# Patient Record
Sex: FEMALE | Race: WHITE | NOT HISPANIC OR LATINO
[De-identification: names, ages, dates, MRNs, and addresses within clinical notes are randomized per-mention and may not be internally consistent; named-entity substitution may affect disease eponyms.]

---

## 2017-01-05 ENCOUNTER — APPOINTMENT (OUTPATIENT)
Dept: PULMONOLOGY | Facility: CLINIC | Age: 67
End: 2017-01-05

## 2017-02-15 ENCOUNTER — APPOINTMENT (OUTPATIENT)
Dept: INTERNAL MEDICINE | Facility: CLINIC | Age: 67
End: 2017-02-15

## 2017-02-15 VITALS
HEART RATE: 99 BPM | BODY MASS INDEX: 31.8 KG/M2 | SYSTOLIC BLOOD PRESSURE: 132 MMHG | HEIGHT: 60 IN | WEIGHT: 162 LBS | DIASTOLIC BLOOD PRESSURE: 70 MMHG | OXYGEN SATURATION: 98 %

## 2017-02-17 LAB
ALBUMIN SERPL ELPH-MCNC: 3.9 G/DL
ALP BLD-CCNC: 101 U/L
ALT SERPL-CCNC: 21 U/L
ANION GAP SERPL CALC-SCNC: 17 MMOL/L
APPEARANCE: CLEAR
AST SERPL-CCNC: 19 U/L
BACTERIA: ABNORMAL
BASOPHILS # BLD AUTO: 0.03 K/UL
BASOPHILS NFR BLD AUTO: 0.5 %
BILIRUB SERPL-MCNC: 0.4 MG/DL
BILIRUBIN URINE: NEGATIVE
BLOOD URINE: NEGATIVE
BUN SERPL-MCNC: 16 MG/DL
CALCIUM OXALATE CRYSTALS: ABNORMAL
CALCIUM SERPL-MCNC: 9.7 MG/DL
CHLORIDE SERPL-SCNC: 103 MMOL/L
CHOLEST SERPL-MCNC: 172 MG/DL
CHOLEST/HDLC SERPL: 4 RATIO
CO2 SERPL-SCNC: 23 MMOL/L
COLOR: YELLOW
CREAT SERPL-MCNC: 0.85 MG/DL
EOSINOPHIL # BLD AUTO: 0.1 K/UL
EOSINOPHIL NFR BLD AUTO: 1.6 %
GLUCOSE QUALITATIVE U: NORMAL MG/DL
GLUCOSE SERPL-MCNC: 79 MG/DL
HBA1C MFR BLD HPLC: 6.2 %
HCT VFR BLD CALC: 43.6 %
HDLC SERPL-MCNC: 43 MG/DL
HGB BLD-MCNC: 13.8 G/DL
HYALINE CASTS: 0 /LPF
IMM GRANULOCYTES NFR BLD AUTO: 0.3 %
KETONES URINE: NEGATIVE
LDLC SERPL CALC-MCNC: 86 MG/DL
LEUKOCYTE ESTERASE URINE: NEGATIVE
LYMPHOCYTES # BLD AUTO: 1.77 K/UL
LYMPHOCYTES NFR BLD AUTO: 27.8 %
MAN DIFF?: NORMAL
MCHC RBC-ENTMCNC: 30.4 PG
MCHC RBC-ENTMCNC: 31.7 GM/DL
MCV RBC AUTO: 96 FL
MICROSCOPIC-UA: NORMAL
MONOCYTES # BLD AUTO: 0.42 K/UL
MONOCYTES NFR BLD AUTO: 6.6 %
NEUTROPHILS # BLD AUTO: 4.02 K/UL
NEUTROPHILS NFR BLD AUTO: 63.2 %
NITRITE URINE: NEGATIVE
PH URINE: 5.5
PLATELET # BLD AUTO: 233 K/UL
POTASSIUM SERPL-SCNC: 4.6 MMOL/L
PROT SERPL-MCNC: 7.1 G/DL
PROTEIN URINE: NEGATIVE MG/DL
RBC # BLD: 4.54 M/UL
RBC # FLD: 13.1 %
RED BLOOD CELLS URINE: 2 /HPF
SODIUM SERPL-SCNC: 143 MMOL/L
SPECIFIC GRAVITY URINE: 1.02
SQUAMOUS EPITHELIAL CELLS: 7 /HPF
TRIGL SERPL-MCNC: 214 MG/DL
URINE COMMENTS: NORMAL
UROBILINOGEN URINE: NORMAL MG/DL
WBC # FLD AUTO: 6.36 K/UL
WHITE BLOOD CELLS URINE: 2 /HPF

## 2017-03-23 ENCOUNTER — APPOINTMENT (OUTPATIENT)
Dept: ORTHOPEDIC SURGERY | Facility: CLINIC | Age: 67
End: 2017-03-23

## 2017-03-23 VITALS
BODY MASS INDEX: 31.02 KG/M2 | HEART RATE: 106 BPM | DIASTOLIC BLOOD PRESSURE: 75 MMHG | HEIGHT: 60 IN | SYSTOLIC BLOOD PRESSURE: 124 MMHG | WEIGHT: 158 LBS

## 2017-04-06 ENCOUNTER — APPOINTMENT (OUTPATIENT)
Dept: ORTHOPEDIC SURGERY | Facility: CLINIC | Age: 67
End: 2017-04-06

## 2017-04-13 ENCOUNTER — APPOINTMENT (OUTPATIENT)
Dept: ORTHOPEDIC SURGERY | Facility: CLINIC | Age: 67
End: 2017-04-13

## 2017-04-20 ENCOUNTER — APPOINTMENT (OUTPATIENT)
Dept: ORTHOPEDIC SURGERY | Facility: CLINIC | Age: 67
End: 2017-04-20

## 2017-05-31 ENCOUNTER — APPOINTMENT (OUTPATIENT)
Dept: VASCULAR SURGERY | Facility: CLINIC | Age: 67
End: 2017-05-31

## 2017-05-31 VITALS — SYSTOLIC BLOOD PRESSURE: 120 MMHG | OXYGEN SATURATION: 97 % | HEART RATE: 75 BPM | DIASTOLIC BLOOD PRESSURE: 75 MMHG

## 2017-06-01 ENCOUNTER — APPOINTMENT (OUTPATIENT)
Dept: ORTHOPEDIC SURGERY | Facility: CLINIC | Age: 67
End: 2017-06-01

## 2017-06-01 VITALS
WEIGHT: 158 LBS | HEART RATE: 96 BPM | DIASTOLIC BLOOD PRESSURE: 68 MMHG | BODY MASS INDEX: 31.02 KG/M2 | HEIGHT: 60 IN | SYSTOLIC BLOOD PRESSURE: 112 MMHG

## 2017-06-06 ENCOUNTER — FORM ENCOUNTER (OUTPATIENT)
Age: 67
End: 2017-06-06

## 2017-06-07 ENCOUNTER — OUTPATIENT (OUTPATIENT)
Dept: OUTPATIENT SERVICES | Facility: HOSPITAL | Age: 67
LOS: 1 days | End: 2017-06-07
Payer: MEDICARE

## 2017-06-07 PROCEDURE — 75635 CT ANGIO ABDOMINAL ARTERIES: CPT | Mod: 26

## 2017-06-07 PROCEDURE — 75635 CT ANGIO ABDOMINAL ARTERIES: CPT

## 2017-06-21 ENCOUNTER — APPOINTMENT (OUTPATIENT)
Dept: VASCULAR SURGERY | Facility: CLINIC | Age: 67
End: 2017-06-21

## 2017-06-27 ENCOUNTER — APPOINTMENT (OUTPATIENT)
Dept: INTERNAL MEDICINE | Facility: CLINIC | Age: 67
End: 2017-06-27

## 2017-06-27 VITALS
DIASTOLIC BLOOD PRESSURE: 74 MMHG | TEMPERATURE: 97.7 F | SYSTOLIC BLOOD PRESSURE: 132 MMHG | HEIGHT: 60 IN | OXYGEN SATURATION: 98 % | BODY MASS INDEX: 32.39 KG/M2 | HEART RATE: 90 BPM | WEIGHT: 165 LBS

## 2017-10-04 ENCOUNTER — APPOINTMENT (OUTPATIENT)
Dept: VASCULAR SURGERY | Facility: CLINIC | Age: 67
End: 2017-10-04
Payer: MEDICARE

## 2017-10-04 VITALS
HEART RATE: 84 BPM | SYSTOLIC BLOOD PRESSURE: 124 MMHG | DIASTOLIC BLOOD PRESSURE: 82 MMHG | OXYGEN SATURATION: 98 % | HEIGHT: 60 IN | WEIGHT: 165 LBS | BODY MASS INDEX: 32.39 KG/M2

## 2017-10-04 PROCEDURE — 99213 OFFICE O/P EST LOW 20 MIN: CPT

## 2017-12-13 ENCOUNTER — RX RENEWAL (OUTPATIENT)
Age: 67
End: 2017-12-13

## 2017-12-14 ENCOUNTER — APPOINTMENT (OUTPATIENT)
Dept: ORTHOPEDIC SURGERY | Facility: CLINIC | Age: 67
End: 2017-12-14
Payer: MEDICARE

## 2017-12-14 VITALS
SYSTOLIC BLOOD PRESSURE: 132 MMHG | HEART RATE: 93 BPM | WEIGHT: 177 LBS | HEIGHT: 60 IN | DIASTOLIC BLOOD PRESSURE: 75 MMHG | BODY MASS INDEX: 34.75 KG/M2

## 2017-12-14 PROCEDURE — 99213 OFFICE O/P EST LOW 20 MIN: CPT

## 2017-12-18 ENCOUNTER — MESSAGE (OUTPATIENT)
Age: 67
End: 2017-12-18

## 2017-12-19 ENCOUNTER — APPOINTMENT (OUTPATIENT)
Dept: ORTHOPEDIC SURGERY | Facility: CLINIC | Age: 67
End: 2017-12-19
Payer: MEDICARE

## 2017-12-19 PROCEDURE — 20610 DRAIN/INJ JOINT/BURSA W/O US: CPT | Mod: 50

## 2017-12-26 ENCOUNTER — APPOINTMENT (OUTPATIENT)
Dept: ORTHOPEDIC SURGERY | Facility: CLINIC | Age: 67
End: 2017-12-26
Payer: MEDICARE

## 2017-12-26 PROCEDURE — 20610 DRAIN/INJ JOINT/BURSA W/O US: CPT | Mod: 50

## 2018-01-02 ENCOUNTER — APPOINTMENT (OUTPATIENT)
Dept: ORTHOPEDIC SURGERY | Facility: CLINIC | Age: 68
End: 2018-01-02
Payer: MEDICARE

## 2018-01-02 VITALS — WEIGHT: 177 LBS | HEIGHT: 60 IN | BODY MASS INDEX: 34.75 KG/M2

## 2018-01-02 PROCEDURE — 20610 DRAIN/INJ JOINT/BURSA W/O US: CPT | Mod: 50

## 2018-01-08 ENCOUNTER — RX RENEWAL (OUTPATIENT)
Age: 68
End: 2018-01-08

## 2018-01-22 ENCOUNTER — APPOINTMENT (OUTPATIENT)
Dept: CT IMAGING | Facility: HOSPITAL | Age: 68
End: 2018-01-22

## 2018-01-22 ENCOUNTER — OUTPATIENT (OUTPATIENT)
Dept: OUTPATIENT SERVICES | Facility: HOSPITAL | Age: 68
LOS: 1 days | End: 2018-01-22
Payer: MEDICARE

## 2018-01-22 PROCEDURE — 71250 CT THORAX DX C-: CPT | Mod: 26

## 2018-01-22 PROCEDURE — 71250 CT THORAX DX C-: CPT

## 2018-02-15 ENCOUNTER — APPOINTMENT (OUTPATIENT)
Dept: INTERNAL MEDICINE | Facility: CLINIC | Age: 68
End: 2018-02-15
Payer: MEDICARE

## 2018-02-15 VITALS
WEIGHT: 168 LBS | DIASTOLIC BLOOD PRESSURE: 74 MMHG | HEART RATE: 90 BPM | BODY MASS INDEX: 32.81 KG/M2 | SYSTOLIC BLOOD PRESSURE: 124 MMHG | OXYGEN SATURATION: 98 %

## 2018-02-15 PROCEDURE — 36415 COLL VENOUS BLD VENIPUNCTURE: CPT

## 2018-02-15 PROCEDURE — 99213 OFFICE O/P EST LOW 20 MIN: CPT | Mod: 25

## 2018-02-19 LAB
25(OH)D3 SERPL-MCNC: 32.9 NG/ML
ALBUMIN SERPL ELPH-MCNC: 4.3 G/DL
ALP BLD-CCNC: 88 U/L
ALT SERPL-CCNC: 22 U/L
ANION GAP SERPL CALC-SCNC: 14 MMOL/L
APPEARANCE: CLEAR
AST SERPL-CCNC: 25 U/L
BACTERIA: NEGATIVE
BASOPHILS # BLD AUTO: 0.01 K/UL
BASOPHILS NFR BLD AUTO: 0.2 %
BILIRUB SERPL-MCNC: 0.4 MG/DL
BILIRUBIN URINE: NEGATIVE
BLOOD URINE: NEGATIVE
BUN SERPL-MCNC: 15 MG/DL
CALCIUM SERPL-MCNC: 9.8 MG/DL
CHLORIDE SERPL-SCNC: 103 MMOL/L
CHOLEST SERPL-MCNC: 169 MG/DL
CHOLEST/HDLC SERPL: 3.3 RATIO
CO2 SERPL-SCNC: 27 MMOL/L
COLOR: YELLOW
CREAT SERPL-MCNC: 0.92 MG/DL
EOSINOPHIL # BLD AUTO: 0.19 K/UL
EOSINOPHIL NFR BLD AUTO: 3.2 %
GLUCOSE QUALITATIVE U: NEGATIVE MG/DL
GLUCOSE SERPL-MCNC: 88 MG/DL
HBA1C MFR BLD HPLC: 6 %
HCT VFR BLD CALC: 43.8 %
HDLC SERPL-MCNC: 51 MG/DL
HGB BLD-MCNC: 13.4 G/DL
HYALINE CASTS: 0 /LPF
IMM GRANULOCYTES NFR BLD AUTO: 0 %
KETONES URINE: NEGATIVE
LDLC SERPL CALC-MCNC: 93 MG/DL
LEUKOCYTE ESTERASE URINE: NEGATIVE
LYMPHOCYTES # BLD AUTO: 1.57 K/UL
LYMPHOCYTES NFR BLD AUTO: 26.1 %
MAN DIFF?: NORMAL
MCHC RBC-ENTMCNC: 30.3 PG
MCHC RBC-ENTMCNC: 30.6 GM/DL
MCV RBC AUTO: 99.1 FL
MICROSCOPIC-UA: NORMAL
MONOCYTES # BLD AUTO: 0.31 K/UL
MONOCYTES NFR BLD AUTO: 5.2 %
NEUTROPHILS # BLD AUTO: 3.93 K/UL
NEUTROPHILS NFR BLD AUTO: 65.3 %
NITRITE URINE: NEGATIVE
PH URINE: 5
PLATELET # BLD AUTO: 208 K/UL
POTASSIUM SERPL-SCNC: 4.6 MMOL/L
PROT SERPL-MCNC: 7.1 G/DL
PROTEIN URINE: NEGATIVE MG/DL
RBC # BLD: 4.42 M/UL
RBC # FLD: 13.2 %
RED BLOOD CELLS URINE: 1 /HPF
SODIUM SERPL-SCNC: 144 MMOL/L
SPECIFIC GRAVITY URINE: 1.01
SQUAMOUS EPITHELIAL CELLS: 0 /HPF
TRIGL SERPL-MCNC: 123 MG/DL
UROBILINOGEN URINE: NEGATIVE MG/DL
WBC # FLD AUTO: 6.01 K/UL
WHITE BLOOD CELLS URINE: 0 /HPF

## 2018-03-13 ENCOUNTER — APPOINTMENT (OUTPATIENT)
Dept: INTERNAL MEDICINE | Facility: CLINIC | Age: 68
End: 2018-03-13
Payer: MEDICARE

## 2018-03-13 VITALS — DIASTOLIC BLOOD PRESSURE: 74 MMHG | OXYGEN SATURATION: 98 % | SYSTOLIC BLOOD PRESSURE: 132 MMHG | HEART RATE: 78 BPM

## 2018-03-13 PROCEDURE — 99213 OFFICE O/P EST LOW 20 MIN: CPT

## 2018-03-14 ENCOUNTER — RX RENEWAL (OUTPATIENT)
Age: 68
End: 2018-03-14

## 2018-09-07 ENCOUNTER — APPOINTMENT (OUTPATIENT)
Dept: INTERNAL MEDICINE | Facility: CLINIC | Age: 68
End: 2018-09-07
Payer: MEDICARE

## 2018-09-07 VITALS
DIASTOLIC BLOOD PRESSURE: 68 MMHG | BODY MASS INDEX: 33.2 KG/M2 | WEIGHT: 170 LBS | OXYGEN SATURATION: 98 % | HEART RATE: 84 BPM | SYSTOLIC BLOOD PRESSURE: 124 MMHG

## 2018-09-07 DIAGNOSIS — R10.32 LEFT LOWER QUADRANT PAIN: ICD-10-CM

## 2018-09-07 PROCEDURE — 99213 OFFICE O/P EST LOW 20 MIN: CPT

## 2018-09-07 NOTE — ASSESSMENT
[FreeTextEntry1] : Patient with elevated BP; Will split current pill; Once in morning and once in evening. Also to obtain sonogram of the left groin. Finally restsrt H2 blocker for the cough

## 2018-09-07 NOTE — HISTORY OF PRESENT ILLNESS
[FreeTextEntry1] : BP up at pharmacy; Today okay' [de-identified] : As above; had a cough for a long time; There was a question of reflux and H2 blocker given with no relief; Was taking omeprazole fo the cough with no relief;

## 2018-09-07 NOTE — PHYSICAL EXAM
[No Acute Distress] : no acute distress [Well Nourished] : well nourished [Well Developed] : well developed [Normal Sclera/Conjunctiva] : normal sclera/conjunctiva [PERRL] : pupils equal round and reactive to light [EOMI] : extraocular movements intact [Normal Outer Ear/Nose] : the outer ears and nose were normal in appearance [Normal Oropharynx] : the oropharynx was normal [No JVD] : no jugular venous distention [Supple] : supple [No Respiratory Distress] : no respiratory distress  [Clear to Auscultation] : lungs were clear to auscultation bilaterally [No Accessory Muscle Use] : no accessory muscle use [Normal Rate] : normal rate  [Regular Rhythm] : with a regular rhythm [Normal S1, S2] : normal S1 and S2 [Soft] : abdomen soft [No HSM] : no HSM [FreeTextEntry1] : Left groin pain [de-identified] : left groin pain

## 2018-09-25 ENCOUNTER — APPOINTMENT (OUTPATIENT)
Dept: ORTHOPEDIC SURGERY | Facility: CLINIC | Age: 68
End: 2018-09-25
Payer: MEDICARE

## 2018-09-25 VITALS
SYSTOLIC BLOOD PRESSURE: 168 MMHG | HEART RATE: 108 BPM | HEIGHT: 60 IN | BODY MASS INDEX: 33.38 KG/M2 | WEIGHT: 170 LBS | DIASTOLIC BLOOD PRESSURE: 74 MMHG

## 2018-09-25 PROCEDURE — 73562 X-RAY EXAM OF KNEE 3: CPT | Mod: 50

## 2018-09-25 PROCEDURE — 99213 OFFICE O/P EST LOW 20 MIN: CPT

## 2018-09-27 ENCOUNTER — APPOINTMENT (OUTPATIENT)
Dept: ORTHOPEDIC SURGERY | Facility: CLINIC | Age: 68
End: 2018-09-27
Payer: MEDICARE

## 2018-09-27 ENCOUNTER — RX RENEWAL (OUTPATIENT)
Age: 68
End: 2018-09-27

## 2018-09-27 PROCEDURE — 20610 DRAIN/INJ JOINT/BURSA W/O US: CPT | Mod: 50

## 2018-10-01 ENCOUNTER — RX RENEWAL (OUTPATIENT)
Age: 68
End: 2018-10-01

## 2018-10-04 ENCOUNTER — APPOINTMENT (OUTPATIENT)
Dept: ORTHOPEDIC SURGERY | Facility: CLINIC | Age: 68
End: 2018-10-04
Payer: MEDICARE

## 2018-10-04 PROCEDURE — 20610 DRAIN/INJ JOINT/BURSA W/O US: CPT | Mod: 50

## 2018-10-08 ENCOUNTER — FORM ENCOUNTER (OUTPATIENT)
Age: 68
End: 2018-10-08

## 2018-10-09 ENCOUNTER — OUTPATIENT (OUTPATIENT)
Dept: OUTPATIENT SERVICES | Facility: HOSPITAL | Age: 68
LOS: 1 days | End: 2018-10-09
Payer: MEDICARE

## 2018-10-09 ENCOUNTER — APPOINTMENT (OUTPATIENT)
Dept: ULTRASOUND IMAGING | Facility: HOSPITAL | Age: 68
End: 2018-10-09
Payer: MEDICARE

## 2018-10-09 PROCEDURE — 76882 US LMTD JT/FCL EVL NVASC XTR: CPT

## 2018-10-09 PROCEDURE — 76882 US LMTD JT/FCL EVL NVASC XTR: CPT | Mod: 26,LT

## 2018-10-11 ENCOUNTER — APPOINTMENT (OUTPATIENT)
Dept: ORTHOPEDIC SURGERY | Facility: CLINIC | Age: 68
End: 2018-10-11

## 2018-10-16 ENCOUNTER — APPOINTMENT (OUTPATIENT)
Dept: ORTHOPEDIC SURGERY | Facility: CLINIC | Age: 68
End: 2018-10-16
Payer: MEDICARE

## 2018-10-16 PROCEDURE — 20610 DRAIN/INJ JOINT/BURSA W/O US: CPT | Mod: 50

## 2018-10-30 ENCOUNTER — APPOINTMENT (OUTPATIENT)
Dept: ORTHOPEDIC SURGERY | Facility: CLINIC | Age: 68
End: 2018-10-30
Payer: MEDICARE

## 2018-10-30 VITALS
OXYGEN SATURATION: 73 % | HEIGHT: 60 IN | BODY MASS INDEX: 33.38 KG/M2 | WEIGHT: 170 LBS | SYSTOLIC BLOOD PRESSURE: 144 MMHG | DIASTOLIC BLOOD PRESSURE: 73 MMHG | HEART RATE: 105 BPM

## 2018-10-30 PROCEDURE — 99214 OFFICE O/P EST MOD 30 MIN: CPT | Mod: 25

## 2018-10-30 PROCEDURE — 20610 DRAIN/INJ JOINT/BURSA W/O US: CPT | Mod: LT

## 2018-11-02 ENCOUNTER — RX RENEWAL (OUTPATIENT)
Age: 68
End: 2018-11-02

## 2018-11-06 ENCOUNTER — APPOINTMENT (OUTPATIENT)
Dept: ORTHOPEDIC SURGERY | Facility: CLINIC | Age: 68
End: 2018-11-06
Payer: MEDICARE

## 2018-11-06 VITALS — WEIGHT: 170 LBS | BODY MASS INDEX: 33.38 KG/M2 | HEIGHT: 60 IN

## 2018-11-06 PROCEDURE — 99214 OFFICE O/P EST MOD 30 MIN: CPT

## 2018-11-09 ENCOUNTER — LABORATORY RESULT (OUTPATIENT)
Age: 68
End: 2018-11-09

## 2018-11-09 ENCOUNTER — APPOINTMENT (OUTPATIENT)
Dept: INTERNAL MEDICINE | Facility: CLINIC | Age: 68
End: 2018-11-09
Payer: MEDICARE

## 2018-11-09 VITALS
HEIGHT: 60 IN | WEIGHT: 170 LBS | OXYGEN SATURATION: 100 % | SYSTOLIC BLOOD PRESSURE: 144 MMHG | DIASTOLIC BLOOD PRESSURE: 74 MMHG | HEART RATE: 89 BPM | TEMPERATURE: 97.8 F | BODY MASS INDEX: 33.38 KG/M2

## 2018-11-09 PROCEDURE — 36415 COLL VENOUS BLD VENIPUNCTURE: CPT

## 2018-11-09 PROCEDURE — 99213 OFFICE O/P EST LOW 20 MIN: CPT | Mod: 25

## 2018-11-09 PROCEDURE — 93000 ELECTROCARDIOGRAM COMPLETE: CPT

## 2018-11-09 NOTE — ASSESSMENT
[Patient Optimized for Surgery] : Patient optimized for surgery [FreeTextEntry4] : Good risk for OR: Will review pending studies;

## 2018-11-09 NOTE — HISTORY OF PRESENT ILLNESS
[FreeTextEntry1] : Knee surgery [FreeTextEntry2] : 11/30/2018 [FreeTextEntry3] : Dr. Singh [FreeTextEntry4] : Needs left knee surgery; Medical problems include hypertension and HLD. No more smoking; Some cough'

## 2018-11-12 LAB
ALBUMIN SERPL ELPH-MCNC: 3.9 G/DL
ALP BLD-CCNC: 78 U/L
ALT SERPL-CCNC: 20 U/L
ANION GAP SERPL CALC-SCNC: 14 MMOL/L
APPEARANCE: ABNORMAL
APTT BLD: 29.9 SEC
AST SERPL-CCNC: 23 U/L
BASOPHILS # BLD AUTO: 0.03 K/UL
BASOPHILS NFR BLD AUTO: 0.3 %
BILIRUB SERPL-MCNC: 0.4 MG/DL
BILIRUBIN URINE: NEGATIVE
BLOOD URINE: NEGATIVE
BUN SERPL-MCNC: 24 MG/DL
CALCIUM SERPL-MCNC: 9.8 MG/DL
CHLORIDE SERPL-SCNC: 101 MMOL/L
CO2 SERPL-SCNC: 26 MMOL/L
COLOR: YELLOW
CREAT SERPL-MCNC: 0.92 MG/DL
EOSINOPHIL # BLD AUTO: 0.08 K/UL
EOSINOPHIL NFR BLD AUTO: 0.8 %
GLUCOSE QUALITATIVE U: NEGATIVE MG/DL
GLUCOSE SERPL-MCNC: 84 MG/DL
HCT VFR BLD CALC: 41.2 %
HGB BLD-MCNC: 12.7 G/DL
IMM GRANULOCYTES NFR BLD AUTO: 0.3 %
INR PPP: 1.03 RATIO
KETONES URINE: NEGATIVE
LEUKOCYTE ESTERASE URINE: NEGATIVE
LYMPHOCYTES # BLD AUTO: 1.5 K/UL
LYMPHOCYTES NFR BLD AUTO: 15.8 %
MAN DIFF?: NORMAL
MCHC RBC-ENTMCNC: 30.2 PG
MCHC RBC-ENTMCNC: 30.8 GM/DL
MCV RBC AUTO: 98.1 FL
MONOCYTES # BLD AUTO: 0.48 K/UL
MONOCYTES NFR BLD AUTO: 5.1 %
NEUTROPHILS # BLD AUTO: 7.36 K/UL
NEUTROPHILS NFR BLD AUTO: 77.7 %
NITRITE URINE: NEGATIVE
PH URINE: 5
PLATELET # BLD AUTO: 274 K/UL
POTASSIUM SERPL-SCNC: 4.2 MMOL/L
PROT SERPL-MCNC: 7.1 G/DL
PROTEIN URINE: NEGATIVE MG/DL
PT BLD: 11.5 SEC
RBC # BLD: 4.2 M/UL
RBC # FLD: 13.6 %
SODIUM SERPL-SCNC: 141 MMOL/L
SPECIFIC GRAVITY URINE: 1.03
UROBILINOGEN URINE: NEGATIVE MG/DL
WBC # FLD AUTO: 9.48 K/UL

## 2018-11-30 ENCOUNTER — APPOINTMENT (OUTPATIENT)
Dept: ORTHOPEDIC SURGERY | Facility: HOSPITAL | Age: 68
End: 2018-11-30

## 2018-11-30 ENCOUNTER — OUTPATIENT (OUTPATIENT)
Dept: OUTPATIENT SERVICES | Facility: HOSPITAL | Age: 68
LOS: 1 days | Discharge: HOME | End: 2018-11-30
Payer: MEDICARE

## 2018-11-30 ENCOUNTER — RESULT REVIEW (OUTPATIENT)
Age: 68
End: 2018-11-30

## 2018-11-30 VITALS — HEART RATE: 74 BPM | OXYGEN SATURATION: 95 % | TEMPERATURE: 98 F | RESPIRATION RATE: 19 BRPM

## 2018-11-30 VITALS
DIASTOLIC BLOOD PRESSURE: 71 MMHG | HEIGHT: 61 IN | SYSTOLIC BLOOD PRESSURE: 129 MMHG | OXYGEN SATURATION: 99 % | HEART RATE: 74 BPM | RESPIRATION RATE: 20 BRPM | TEMPERATURE: 98 F | WEIGHT: 169.32 LBS

## 2018-11-30 DIAGNOSIS — Z41.9 ENCOUNTER FOR PROCEDURE FOR PURPOSES OTHER THAN REMEDYING HEALTH STATE, UNSPECIFIED: Chronic | ICD-10-CM

## 2018-11-30 DIAGNOSIS — F41.1 GENERALIZED ANXIETY DISORDER: ICD-10-CM

## 2018-11-30 DIAGNOSIS — Z98.890 OTHER SPECIFIED POSTPROCEDURAL STATES: Chronic | ICD-10-CM

## 2018-11-30 DIAGNOSIS — I10 ESSENTIAL (PRIMARY) HYPERTENSION: ICD-10-CM

## 2018-11-30 DIAGNOSIS — S83.242A OTHER TEAR OF MEDIAL MENISCUS, CURRENT INJURY, LEFT KNEE, INITIAL ENCOUNTER: ICD-10-CM

## 2018-11-30 DIAGNOSIS — E78.2 MIXED HYPERLIPIDEMIA: ICD-10-CM

## 2018-11-30 DIAGNOSIS — K21.9 GASTRO-ESOPHAGEAL REFLUX DISEASE WITHOUT ESOPHAGITIS: ICD-10-CM

## 2018-11-30 DIAGNOSIS — M17.12 UNILATERAL PRIMARY OSTEOARTHRITIS, LEFT KNEE: ICD-10-CM

## 2018-11-30 PROCEDURE — 29873 ARTHRS KNEE SURG W/LAT RLS: CPT | Mod: LT

## 2018-11-30 PROCEDURE — 29881 ARTHRS KNE SRG MNISECTMY M/L: CPT | Mod: LT

## 2018-11-30 PROCEDURE — 88304 TISSUE EXAM BY PATHOLOGIST: CPT

## 2018-11-30 RX ORDER — OXYCODONE AND ACETAMINOPHEN 5; 325 MG/1; MG/1
1 TABLET ORAL EVERY 4 HOURS
Qty: 0 | Refills: 0 | Status: DISCONTINUED | OUTPATIENT
Start: 2018-11-30 | End: 2018-11-30

## 2018-11-30 RX ORDER — SODIUM CHLORIDE 9 MG/ML
1000 INJECTION, SOLUTION INTRAVENOUS
Qty: 0 | Refills: 0 | Status: DISCONTINUED | OUTPATIENT
Start: 2018-11-30 | End: 2018-11-30

## 2018-11-30 RX ORDER — OXYCODONE AND ACETAMINOPHEN 5; 325 MG/1; MG/1
2 TABLET ORAL EVERY 4 HOURS
Qty: 0 | Refills: 0 | Status: DISCONTINUED | OUTPATIENT
Start: 2018-11-30 | End: 2018-11-30

## 2018-11-30 RX ORDER — MORPHINE SULFATE 50 MG/1
4 CAPSULE, EXTENDED RELEASE ORAL
Qty: 0 | Refills: 0 | Status: DISCONTINUED | OUTPATIENT
Start: 2018-11-30 | End: 2018-11-30

## 2018-11-30 RX ORDER — ONDANSETRON 8 MG/1
4 TABLET, FILM COATED ORAL ONCE
Qty: 0 | Refills: 0 | Status: DISCONTINUED | OUTPATIENT
Start: 2018-11-30 | End: 2018-11-30

## 2018-11-30 RX ORDER — NABUMETONE 750 MG
1 TABLET ORAL
Qty: 30 | Refills: 0 | OUTPATIENT
Start: 2018-11-30 | End: 2018-12-09

## 2018-11-30 RX ORDER — NABUMETONE 750 MG
1 TABLET ORAL
Qty: 14 | Refills: 0
Start: 2018-11-30 | End: 2018-12-06

## 2018-11-30 RX ADMIN — MORPHINE SULFATE 4 MILLIGRAM(S): 50 CAPSULE, EXTENDED RELEASE ORAL at 11:43

## 2018-11-30 NOTE — ASU PATIENT PROFILE, ADULT - PSH
Elective surgery  fibroid removed -1985  Elective surgery  Rt. wrist-3/2010  History of hand surgery  Right-3/16  History of myomectomy  1985

## 2018-11-30 NOTE — CONSULT NOTE ADULT - SUBJECTIVE AND OBJECTIVE BOX
HPI:  68 year old white female with left knee torn medial meniscus with moderate left knee pain and swelling.  MRI confirmed diagnosis and osteoarthritis.  Symptoms worse with stairs and after prolonged immobility and persist over time.      PAST MEDICAL & SURGICAL HISTORY:  HBP GERD HYPERCHOLESTEROLEMIA  Denies DM    REVIEW OF SYSTEMS    General:  normal  Skin/Breast: normal  Ophthalmologic: negative  ENMT:	normal  Respiratory and Thorax: normal  Cardiovascular:	normal  Gastrointestinal:	normal  Genitourinary:	normal  Musculoskeletal:	 swelling left knee  Neurological:	normal  Psychiatric:	normal  Hematology/Lymphatics:	 negative  Endocrine:	negative  Allergic/Immunologic:	negative      MEDICATIONS     lipitor 10mg po daily  celebrex 200mg daily  avapro/Hctz 150/12.5 po daily  omeprazole 20mg daily    Allergies: prednisilone    SOCIAL HISTORY: cigs +  no alcohol    FAMILY HISTORY: non contributory    PHYSICAL EXAM:    Vital Signs Last 24 Hrs  T(C): --  T(F): --98.6  HR: --80  BP: --130/80  BP(mean): --  RR: --16  SpO2: --    Constitutional: WDWNF in NAD  Eyes: conj pink  ENMT: negative  Neck: supple  Breasts: not examined   Back: negative  Respiratory: clear to P&A  Cardiovascular: no MRGT or H  Gastrointestinal: normal bowel sounds  Genitourinary: neg  Rectal: not examined  Extremities: normal  Vascular: normal  Neurological: normal  Skin: negative  Lymph Nodes: negative  Musculoskeletal:   decreased ROM  left knee  Psychiatric: anxiety

## 2018-11-30 NOTE — PACU DISCHARGE NOTE - COMMENTS
Home care and med reconciliation done with pt and her sister.Left knee dressing C/D/I with good pulses and cap refill  /67 ,72 ,16 ,97% ,97.8 .  IV removed-dsg applied  Ambulates well with cane

## 2018-11-30 NOTE — ASU PATIENT PROFILE, ADULT - NS PRO AD PATIENT TYPE
Health Care Proxy (HCP)/Delgado Singleton-sister- Health Care Proxy (HCP)/Delgado SingletonJtdyuihota-urqvfj-[phone]

## 2018-11-30 NOTE — BRIEF OPERATIVE NOTE - PROCEDURE
<<-----Click on this checkbox to enter Procedure Chondroplasty of knee  11/30/2018  right knee  Active  PAULIE  Lateral retinacular release  11/30/2018  right knee  Active  PAULIE  Meniscectomy, knee, medial, partial  11/30/2018  right knee  Active  PAULIE

## 2018-12-03 ENCOUNTER — OTHER (OUTPATIENT)
Age: 68
End: 2018-12-03

## 2018-12-06 PROBLEM — E78.5 HYPERLIPIDEMIA, UNSPECIFIED: Chronic | Status: ACTIVE | Noted: 2018-11-30

## 2018-12-06 PROBLEM — I10 ESSENTIAL (PRIMARY) HYPERTENSION: Chronic | Status: ACTIVE | Noted: 2018-11-30

## 2018-12-10 LAB — SURGICAL PATHOLOGY STUDY: SIGNIFICANT CHANGE UP

## 2018-12-20 ENCOUNTER — APPOINTMENT (OUTPATIENT)
Dept: ORTHOPEDIC SURGERY | Facility: CLINIC | Age: 68
End: 2018-12-20
Payer: MEDICARE

## 2018-12-20 PROCEDURE — 99024 POSTOP FOLLOW-UP VISIT: CPT

## 2019-01-24 ENCOUNTER — APPOINTMENT (OUTPATIENT)
Dept: ORTHOPEDIC SURGERY | Facility: CLINIC | Age: 69
End: 2019-01-24
Payer: MEDICARE

## 2019-01-24 VITALS
SYSTOLIC BLOOD PRESSURE: 132 MMHG | BODY MASS INDEX: 33.38 KG/M2 | WEIGHT: 170 LBS | HEIGHT: 60 IN | DIASTOLIC BLOOD PRESSURE: 77 MMHG | HEART RATE: 83 BPM | OXYGEN SATURATION: 94 %

## 2019-01-24 PROCEDURE — 99024 POSTOP FOLLOW-UP VISIT: CPT

## 2019-01-24 NOTE — HISTORY OF PRESENT ILLNESS
[___ Weeks Post Op] : [unfilled] weeks post op [Clean/Dry/Intact] : clean, dry and intact [Healed] : healed [Neuro Intact] : an unremarkable neurological exam [Vascular Intact] : ~T peripheral vascular exam normal [Doing Well] : is doing well [Excellent Pain Control] : has excellent pain control [No Sign of Infection] : is showing no signs of infection [Chills] : no chills [Constipation] : no constipation [Diarrhea] : no diarrhea [Dysuria] : no dysuria [Fever] : no fever [Nausea] : no nausea [Vomiting] : no vomiting [Erythema] : not erythematous [Discharge] : absent of discharge [Swelling] : not swollen [Dehiscence] : not dehisced [de-identified] : s/p L knee arthroscopy, partial medial meniscectomy, chondroplasty of the patella, chondroplasty of trochlea, arthroscopic lateral release, DOS: 11/30/18. [de-identified] : 68 year old female presents to the office s/p L knee arthroscopy, partial medial meniscectomy, chondroplasty of the patella, chondroplasty of trochlea, arthroscopic lateral release, DOS: 11/30/18. Patient is doing generally well but notes that she has been experiencing severe pain, especially by night. She also notes an instance of buckling, but denies locking, clicking, and swelling. Patient is ambulating freely at this time. She has been compliant with PT and uses a stationary bicycle for strengthening. [de-identified] : Left knee: FROM hip, portal incisions well healed, mild effusion, 0 - 120 degrees, pain with deep flexion, no crepitus, no medial pain, no lateral pain, no Lachman, no pivot shift, no anterior or posterior drawer, stable, anatomic alignment, no signs of infection, no signs of blood clot.  [de-identified] : No new imaging reviewed today.

## 2019-01-24 NOTE — PROCEDURE
[de-identified] : 68 year old female presents to the office s/p L knee arthroscopy, partial medial meniscectomy, chondroplasty of the patella, chondroplasty of trochlea, arthroscopic lateral release, DOS: 11/30/18. Patient is doing generally well but notes that she has been experiencing severe pain, especially by night. Patient is ambulating freely at this time. She has been compliant with PT and uses a stationary bicycle for strengthening. Patient's physical exam is unremarkable, and shows no sign of blood clot or infection. I reassured the patient that any remaining residual discomfort will lessen with time. In the interim, however, the patient elected to receive a knee injection today, which they tolerated well. We talked about an injection. Discussed at length with the patient the planned steroid and lidocaine injection. The risks, benefits, convalescence and alternatives were reviewed. The possible side effects discussed included but were not limited to: pain, swelling, heat and redness. There symptoms are generally mild but if they are extensive then contact the office. Giving pain relievers by mouth such as NSAIDs or Tylenol can generally treat the reactions to steroid and lidocaine. Rare cases of infection have been noted. Rash, hives and itching may occur post injection. If you have muscle pain or cramps, flushing and or swelling of the face, rapid heartbeat, nausea, dizziness, fever, chills, headache, difficulty breathing, swelling in the arms or legs, or have a prickly feeling of your skin, contact a health care provider immediately. In the future if the pain becomes disabling may need total knee arthroplasty.  At this point, I recommend that the patient continue further strengthening of the surrounding musculature. Additionally, I will retrieve authorization for Euflexxa for her right knee. Patient will follow up in 4 weeks.

## 2019-01-24 NOTE — ADDENDUM
[FreeTextEntry1] : Documented by James Rodriguez, solely acting as a scribe for Dr. Matthew Snowden on 01/24/2019.\par \par All medical record entries made by the Scribe were at my, Dr. Matthew Snowden, direction and personally dictated by me on 01/24/2019 . I have reviewed the chart and agree that the record accurately reflects my personal performance of the history, physical exam, assessment and plan. I have also personally directed, reviewed, and agreed with the chart.

## 2019-02-26 ENCOUNTER — APPOINTMENT (OUTPATIENT)
Dept: ORTHOPEDIC SURGERY | Facility: CLINIC | Age: 69
End: 2019-02-26
Payer: MEDICARE

## 2019-02-26 VITALS
DIASTOLIC BLOOD PRESSURE: 66 MMHG | BODY MASS INDEX: 33.38 KG/M2 | HEIGHT: 60 IN | SYSTOLIC BLOOD PRESSURE: 108 MMHG | HEART RATE: 79 BPM | WEIGHT: 170 LBS

## 2019-02-26 DIAGNOSIS — S83.242D OTHER TEAR OF MEDIAL MENISCUS, CURRENT INJURY, LEFT KNEE, SUBSEQUENT ENCOUNTER: ICD-10-CM

## 2019-02-26 PROCEDURE — 99024 POSTOP FOLLOW-UP VISIT: CPT

## 2019-02-26 NOTE — HISTORY OF PRESENT ILLNESS
[de-identified] : 68 year old female presents for follow-up evaluation of right knee pain s/p L knee arthroscopy, partial medial meniscectomy, chondroplasty of the patella, chondroplasty of trochlea, and arthroscopic lateral release DOS: 11/30/18. The patient was last seen on 1/24/19 when she was provided with a Depo-Medrol injection to her left knee, and she states that she has since experienced complete resolution of her prior pain. She denies any acute complaints at this time, is ambulating freely, and has been compliant with home exercises and physical therapy as recommended.

## 2019-02-26 NOTE — ADDENDUM
[FreeTextEntry1] : I, Avinash Mccracken, acted solely as a scribe for Dr. Matthew Snowden on this date 02/26/2019.

## 2019-02-26 NOTE — DISCUSSION/SUMMARY
[de-identified] : 68 year old female patient presents s/p L knee arthroscopy, partial medial meniscectomy, chondroplasty of the patella, chondroplasty of trochlea, arthroscopic lateral release, DOS: 11/30/18. Patient's physical exam was unremarkable and indicates satisfactory post-operative progression. The patient will proceed with home exercises and therapy to further strengthen the surrounding musculature.\par \par Patient will follow-up prn, perhaps at the new office.

## 2019-02-26 NOTE — PHYSICAL EXAM
[LE] : Sensory: Intact in bilateral lower extremities [DP] : dorsalis pedis 2+ and symmetric bilaterally [PT] : posterior tibial 2+ and symmetric bilaterally [Normal] : Alert and in no acute distress [de-identified] : General appearance: Well nourished, well developed, pleasant, alert, and oriented x3.\par Respiratory: Breathing not labored, in no acute distress.\par HEENT: Normocephalic. EOM intact. Sclerae are clear.\par CV: No apparent abnormalities. No lower leg edema. No varicosities. Pedal pulses are palpable.\par Neurologic: Sensation is intact to light touch in the upper and lower extremities. \par Strength: No muscle weakness.\par Dermatologic: No apparent skin lesions or rash.\par Spine: C spine and L spine appear normal and move freely, normal and nontender.\par Upper Extremities: Hands, wrists, and elbows are normal and move freely. Shoulders are normal and move freely. All range of motion is symmetrical.\par Normal body habitus. Pulses are palpable.\par Review of systems, please see form for complete details. Medical data sheet was reviewed.\par  \par Left knee : FROM hip, healed arthroscopic portals, minimal effusion, 0-125 degrees, less pain with range of motion, no crepitus, no medial joint line pain, no lateral joint line pain, no Lachman, no pivot shift, no anterior drawer, no posterior drawer, stable, anatomic alignment, NV intact.

## 2019-02-26 NOTE — END OF VISIT
[FreeTextEntry3] : All medical record entries made by the Scribe were at my, Dr. Matthew Snowden, direction and personally dictated by me on 02/26/2019. I have reviewed the chart and agree that the record accurately reflects my personal performance of the history, physical exam, assessment and plan. I have also personally directed, reviewed, and agreed with the chart.

## 2019-03-17 ENCOUNTER — RX RENEWAL (OUTPATIENT)
Age: 69
End: 2019-03-17

## 2019-04-24 ENCOUNTER — APPOINTMENT (OUTPATIENT)
Dept: INTERNAL MEDICINE | Facility: CLINIC | Age: 69
End: 2019-04-24
Payer: MEDICARE

## 2019-04-24 VITALS
OXYGEN SATURATION: 98 % | BODY MASS INDEX: 31.8 KG/M2 | DIASTOLIC BLOOD PRESSURE: 66 MMHG | TEMPERATURE: 98.1 F | WEIGHT: 162 LBS | SYSTOLIC BLOOD PRESSURE: 120 MMHG | RESPIRATION RATE: 12 BRPM | HEIGHT: 60 IN | HEART RATE: 104 BPM

## 2019-04-24 PROCEDURE — 99213 OFFICE O/P EST LOW 20 MIN: CPT | Mod: 25

## 2019-04-24 PROCEDURE — 36415 COLL VENOUS BLD VENIPUNCTURE: CPT

## 2019-04-24 NOTE — PHYSICAL EXAM
[No Acute Distress] : no acute distress [Well Nourished] : well nourished [Well Developed] : well developed [Well-Appearing] : well-appearing [PERRL] : pupils equal round and reactive to light [Normal Sclera/Conjunctiva] : normal sclera/conjunctiva [Normal Outer Ear/Nose] : the outer ears and nose were normal in appearance [Normal Oropharynx] : the oropharynx was normal [EOMI] : extraocular movements intact [Normal TMs] : both tympanic membranes were normal [Normal Nasal Mucosa] : the nasal mucosa was normal [Clear to Auscultation] : lungs were clear to auscultation bilaterally [No Respiratory Distress] : no respiratory distress  [No Accessory Muscle Use] : no accessory muscle use [Soft] : abdomen soft [Non Tender] : non-tender [Non-distended] : non-distended [No HSM] : no HSM [de-identified] : Hand swelling

## 2019-04-24 NOTE — HEALTH RISK ASSESSMENT
[Two or more falls in past year] : Patient reported two or more falls in the past year [0] : 2) Feeling down, depressed, or hopeless: Not at all (0) [] : No [[id number]] : 0

## 2019-04-24 NOTE — HISTORY OF PRESENT ILLNESS
[FreeTextEntry1] : Hand and feet are swelling; Also concerned that she has a pinched nerve in neck  [de-identified] : For neck pain uses over the counter medication that seem to help;  Medication without change; Also plantar fascia pain;\par No respiratory problems;

## 2019-04-29 LAB
ALBUMIN SERPL ELPH-MCNC: 4.2 G/DL
ALP BLD-CCNC: 84 U/L
ALT SERPL-CCNC: 19 U/L
ANA SER IF-ACNC: NEGATIVE
ANION GAP SERPL CALC-SCNC: 16 MMOL/L
AST SERPL-CCNC: 20 U/L
BASOPHILS # BLD AUTO: 0.04 K/UL
BASOPHILS NFR BLD AUTO: 0.5 %
BILIRUB SERPL-MCNC: 0.4 MG/DL
BUN SERPL-MCNC: 15 MG/DL
CALCIUM SERPL-MCNC: 9.4 MG/DL
CCP AB SER IA-ACNC: <8 UNITS
CHLORIDE SERPL-SCNC: 98 MMOL/L
CO2 SERPL-SCNC: 26 MMOL/L
CREAT SERPL-MCNC: 0.88 MG/DL
CRP SERPL-MCNC: 0.34 MG/DL
EOSINOPHIL # BLD AUTO: 0.11 K/UL
EOSINOPHIL NFR BLD AUTO: 1.5 %
GLUCOSE SERPL-MCNC: 129 MG/DL
HCT VFR BLD CALC: 42.4 %
HGB BLD-MCNC: 13.5 G/DL
IMM GRANULOCYTES NFR BLD AUTO: 0.3 %
LYMPHOCYTES # BLD AUTO: 1.55 K/UL
LYMPHOCYTES NFR BLD AUTO: 21 %
MAN DIFF?: NORMAL
MCHC RBC-ENTMCNC: 30.5 PG
MCHC RBC-ENTMCNC: 31.8 GM/DL
MCV RBC AUTO: 95.7 FL
MONOCYTES # BLD AUTO: 0.4 K/UL
MONOCYTES NFR BLD AUTO: 5.4 %
NEUTROPHILS # BLD AUTO: 5.26 K/UL
NEUTROPHILS NFR BLD AUTO: 71.3 %
PLATELET # BLD AUTO: 262 K/UL
POTASSIUM SERPL-SCNC: 3.5 MMOL/L
PROT SERPL-MCNC: 6.9 G/DL
RBC # BLD: 4.43 M/UL
RBC # FLD: 12.5 %
RF+CCP IGG SER-IMP: NEGATIVE
RHEUMATOID FACT SER QL: <10 IU/ML
SODIUM SERPL-SCNC: 140 MMOL/L
WBC # FLD AUTO: 7.38 K/UL

## 2019-05-31 ENCOUNTER — APPOINTMENT (OUTPATIENT)
Dept: NEUROSURGERY | Facility: CLINIC | Age: 69
End: 2019-05-31
Payer: MEDICARE

## 2019-05-31 VITALS
HEIGHT: 60 IN | SYSTOLIC BLOOD PRESSURE: 113 MMHG | DIASTOLIC BLOOD PRESSURE: 74 MMHG | OXYGEN SATURATION: 98 % | BODY MASS INDEX: 31.8 KG/M2 | WEIGHT: 162 LBS | RESPIRATION RATE: 16 BRPM | HEART RATE: 83 BPM

## 2019-05-31 DIAGNOSIS — M48.02 SPINAL STENOSIS, CERVICAL REGION: ICD-10-CM

## 2019-05-31 DIAGNOSIS — M54.2 CERVICALGIA: ICD-10-CM

## 2019-05-31 PROCEDURE — 99204 OFFICE O/P NEW MOD 45 MIN: CPT

## 2019-05-31 RX ORDER — METHYLPRED ACET/NACL,ISO-OS/PF 40 MG/ML
40 VIAL (ML) INJECTION
Qty: 1 | Refills: 0 | Status: DISCONTINUED | COMMUNITY
Start: 2019-01-24 | End: 2019-05-31

## 2019-05-31 RX ORDER — TRIAMCINOLONE ACETONIDE 0.25 MG/G
0.03 OINTMENT TOPICAL
Qty: 15 | Refills: 0 | Status: DISCONTINUED | COMMUNITY
Start: 2017-02-14 | End: 2019-05-31

## 2019-05-31 RX ORDER — HYALURONATE SODIUM 20 MG/2 ML
20 SYRINGE (ML) INTRAARTICULAR
Qty: 3 | Refills: 0 | Status: DISCONTINUED | OUTPATIENT
Start: 2017-12-14 | End: 2019-05-31

## 2019-05-31 RX ORDER — HYALURONATE SODIUM 20 MG/2 ML
20 SYRINGE (ML) INTRAARTICULAR
Qty: 3 | Refills: 0 | Status: DISCONTINUED | OUTPATIENT
Start: 2017-03-23 | End: 2019-05-31

## 2019-05-31 RX ORDER — HYALURONATE SODIUM 20 MG/2 ML
20 SYRINGE (ML) INTRAARTICULAR
Qty: 6 | Refills: 0 | Status: DISCONTINUED | OUTPATIENT
Start: 2018-09-25 | End: 2019-05-31

## 2019-05-31 RX ORDER — HYALURONATE SODIUM 20 MG/2 ML
20 SYRINGE (ML) INTRAARTICULAR
Qty: 3 | Refills: 0 | Status: DISCONTINUED | OUTPATIENT
Start: 2019-01-24 | End: 2019-05-31

## 2019-06-01 NOTE — REASON FOR VISIT
[New Patient Visit] : a new patient visit [Referred By: _________] : Patient was referred by JUAN [FreeTextEntry1] : Cervical Spine

## 2019-06-01 NOTE — HISTORY OF PRESENT ILLNESS
[de-identified] : 68 year old woman with past medical history arthritis and high blood pressure referred by Dr. Blanca Castillo for neurosurgical evaluation of cervical spine. She states that neck pain started several months ago and radiates to her RIGHT shoulder. She also reports neck pain associated with headaches. She denies precipitating injury or fall. She has not done physical therapy or pain management. She had MRI done 4/27/19 which she brings for review.\par \par She also reports chronic low back pain for several years, for which she was evaluated by neurosurgeon at [facility] and told she had disc herniations. She was recommended physical therapy for lower back. She states that physical therapy has helped improve her low back pain.\par \par She denies upper extremity weakness, lower extremity weakness, numbness/tingling, bowel and bladder incontinence, imbalance.

## 2019-06-01 NOTE — REVIEW OF SYSTEMS
[Difficulty Walking] : difficulty walking [Joint Pain] : joint pain [Joint Stiffness] : joint stiffness [Limb Swelling] : limb swelling [Negative] : Heme/Lymph [de-identified] : difficulty walking due to pain in LEFT knee

## 2019-06-01 NOTE — PLAN
[FreeTextEntry1] : MRI cervical spine reviewed by Dr. Harvey with patient at length, demonstrates disc herniation at C5-6 and multilevel degenerative disc disease.\par No surgery recommended.\par Recommend physical therapy.\par Educated regarding s/s neurological worsening including weakness, gait difficulty, incontinence severe pain, return to office or report to ED.\par \par Plan:\par \par 1. Physical therapy\par 2. If pain worsens or symptoms progress, return to the office to see Dr. Harvey\par \par Patient verbalizes agreement and understanding with plan.\par

## 2019-06-01 NOTE — CONSULT LETTER
[Dear  ___] : Dear  [unfilled], [Consult Letter:] : I had the pleasure of evaluating your patient, [unfilled]. [Please see my note below.] : Please see my note below. [Consult Closing:] : Thank you very much for allowing me to participate in the care of this patient.  If you have any questions, please do not hesitate to contact me. [Sincerely,] : Sincerely, [FreeTextEntry2] : Dr. Blanca Castillo\par [address], [place] [address] [FreeTextEntry3] : Monroe Harvey MD

## 2019-06-01 NOTE — ADDENDUM
[FreeTextEntry1] : 	May 31, 2019 \par  \par  \par  \par Re:	Pat Guerin  \par :	[birth year] \par MRN:  [id number] \par  \par Ms. Guerin is a 68-year-old woman who presents for consultation regarding neck discomfort.  This has been ongoing for several months now.  The pain starts in the mid-neck and radiates predominantly to the right shoulder region.  In workup for this, she underwent a cervical MRI, which does show multilevel cervical spondylosis with disc disease most prominent at C5-6 and C6-7.  In these areas, there are disc bulges, broad-based, as well as right greater than left foraminal stenosis.  There is no significant cord compression, although the disc bulges do abut the ventral thecal sac.  \par  \par She does indicate that from time to time she prefers to laterally bend the neck to the left shoulder, which does ease the radiating pain to her right shoulder.  She has not undergone physical therapy or seen a pain management physician at this time.  Overall, she describes her pain as mild to moderate and more annoying than anything else. \par  \par  \par On examination, her motor strength is normal.  She has full and painless neck range of motion in all directions.   \par  \par At this point, I would recommend a trial of physical therapy for her, as well as continued anti-inflammatory medications.  Certainly, if her pain worsens, we could consider decompressive surgery.  However, her symptoms are quite mild now and the level of compression is also mild.  We will give her a prescription for PT. \par  \par  \par  \par Monroe Harvey M.D. \par  of Neurosurgery \par [facility] \[facility] Department of Neurosurgery \par [facility] \par [address] \par [facility]ly [facility], Third Floor \[place] [address] \par Tel: [phone] \par Email:  [email or web address] \par  \par  \par [facility] #[id number] \[facility]

## 2019-06-01 NOTE — DATA REVIEWED
[de-identified] : MRI cervical spine 4/27/19:\par -C2/C3, C3/C4, and C5/C6 disc bulge which impinge upon the thecal sac\par -C4/5 disc bulge which impinges upon the spinal cord and C5 nerve roots bilaterally\par -C6/7 left paracentral disc herniation which impinges upon the left ventral aspect of the spinal cord\par - Mild C4/5 and C6/7 spinal stenosis\par -Degenerative disease\par -Straightening of the cervical spine

## 2019-06-20 ENCOUNTER — RX RENEWAL (OUTPATIENT)
Age: 69
End: 2019-06-20

## 2019-07-25 ENCOUNTER — RX RENEWAL (OUTPATIENT)
Age: 69
End: 2019-07-25

## 2019-11-22 ENCOUNTER — RX RENEWAL (OUTPATIENT)
Age: 69
End: 2019-11-22

## 2019-12-22 ENCOUNTER — RX RENEWAL (OUTPATIENT)
Age: 69
End: 2019-12-22

## 2020-01-20 ENCOUNTER — RX RENEWAL (OUTPATIENT)
Age: 70
End: 2020-01-20

## 2020-01-23 ENCOUNTER — APPOINTMENT (OUTPATIENT)
Dept: INTERNAL MEDICINE | Facility: CLINIC | Age: 70
End: 2020-01-23
Payer: MEDICARE

## 2020-01-23 VITALS
HEART RATE: 91 BPM | HEIGHT: 60 IN | OXYGEN SATURATION: 99 % | TEMPERATURE: 97.8 F | BODY MASS INDEX: 31.61 KG/M2 | SYSTOLIC BLOOD PRESSURE: 107 MMHG | WEIGHT: 161 LBS | DIASTOLIC BLOOD PRESSURE: 64 MMHG

## 2020-01-23 DIAGNOSIS — M79.89 OTHER SPECIFIED SOFT TISSUE DISORDERS: ICD-10-CM

## 2020-01-23 PROCEDURE — G0008: CPT

## 2020-01-23 PROCEDURE — 36415 COLL VENOUS BLD VENIPUNCTURE: CPT

## 2020-01-23 PROCEDURE — 99213 OFFICE O/P EST LOW 20 MIN: CPT | Mod: 25

## 2020-01-23 PROCEDURE — 90662 IIV NO PRSV INCREASED AG IM: CPT

## 2020-01-23 NOTE — HISTORY OF PRESENT ILLNESS
[FreeTextEntry1] : Pitched nerve in neck; Recent episode pf wheezing; inhaler given [de-identified] : Inhaler never used; \par Also pain in left thigh; Question of hermatoma; Pain with walking; Sometimes balance off; \par No more mammogram\par No bone density\par No GYN recently

## 2020-01-23 NOTE — ASSESSMENT
[FreeTextEntry1] : Stable examination Flu vaccine given; Also will obtain CT of left lower extremity  Physical therapy; All labs;

## 2020-01-31 LAB
25(OH)D3 SERPL-MCNC: 27 NG/ML
ALBUMIN SERPL ELPH-MCNC: 4.3 G/DL
ALP BLD-CCNC: 78 U/L
ALT SERPL-CCNC: 15 U/L
ANION GAP SERPL CALC-SCNC: 13 MMOL/L
APPEARANCE: CLEAR
AST SERPL-CCNC: 18 U/L
BACTERIA: NEGATIVE
BASOPHILS # BLD AUTO: 0.04 K/UL
BASOPHILS NFR BLD AUTO: 0.6 %
BILIRUB SERPL-MCNC: 0.2 MG/DL
BILIRUBIN URINE: NEGATIVE
BLOOD URINE: NEGATIVE
BUN SERPL-MCNC: 17 MG/DL
CALCIUM SERPL-MCNC: 9.5 MG/DL
CHLORIDE SERPL-SCNC: 100 MMOL/L
CHOLEST SERPL-MCNC: 162 MG/DL
CHOLEST/HDLC SERPL: 4.3 RATIO
CO2 SERPL-SCNC: 27 MMOL/L
COLOR: NORMAL
CREAT SERPL-MCNC: 1.08 MG/DL
EOSINOPHIL # BLD AUTO: 0.1 K/UL
EOSINOPHIL NFR BLD AUTO: 1.4 %
ESTIMATED AVERAGE GLUCOSE: 128 MG/DL
GLUCOSE QUALITATIVE U: NEGATIVE
GLUCOSE SERPL-MCNC: 100 MG/DL
HBA1C MFR BLD HPLC: 6.1 %
HCT VFR BLD CALC: 43.5 %
HDLC SERPL-MCNC: 38 MG/DL
HGB BLD-MCNC: 13.6 G/DL
HYALINE CASTS: 1 /LPF
IMM GRANULOCYTES NFR BLD AUTO: 0.3 %
KETONES URINE: NEGATIVE
LDLC SERPL CALC-MCNC: 80 MG/DL
LEUKOCYTE ESTERASE URINE: NEGATIVE
LYMPHOCYTES # BLD AUTO: 1.85 K/UL
LYMPHOCYTES NFR BLD AUTO: 26.7 %
MAN DIFF?: NORMAL
MCHC RBC-ENTMCNC: 30.6 PG
MCHC RBC-ENTMCNC: 31.3 GM/DL
MCV RBC AUTO: 97.8 FL
MICROSCOPIC-UA: NORMAL
MONOCYTES # BLD AUTO: 0.44 K/UL
MONOCYTES NFR BLD AUTO: 6.3 %
NEUTROPHILS # BLD AUTO: 4.48 K/UL
NEUTROPHILS NFR BLD AUTO: 64.7 %
NITRITE URINE: NEGATIVE
PH URINE: 6
PLATELET # BLD AUTO: 281 K/UL
POTASSIUM SERPL-SCNC: 4.2 MMOL/L
PROT SERPL-MCNC: 6.8 G/DL
PROTEIN URINE: NEGATIVE
RBC # BLD: 4.45 M/UL
RBC # FLD: 12.4 %
RED BLOOD CELLS URINE: 2 /HPF
SODIUM SERPL-SCNC: 140 MMOL/L
SPECIFIC GRAVITY URINE: 1.02
SQUAMOUS EPITHELIAL CELLS: 1 /HPF
T4 FREE SERPL-MCNC: 1.2 NG/DL
TRIGL SERPL-MCNC: 218 MG/DL
TSH SERPL-ACNC: 2.56 UIU/ML
UROBILINOGEN URINE: NORMAL
WBC # FLD AUTO: 6.93 K/UL
WHITE BLOOD CELLS URINE: 0 /HPF

## 2020-02-16 ENCOUNTER — RX RENEWAL (OUTPATIENT)
Age: 70
End: 2020-02-16

## 2020-02-27 ENCOUNTER — APPOINTMENT (OUTPATIENT)
Dept: INTERNAL MEDICINE | Facility: CLINIC | Age: 70
End: 2020-02-27
Payer: MEDICARE

## 2020-02-27 VITALS
OXYGEN SATURATION: 97 % | WEIGHT: 159 LBS | HEART RATE: 89 BPM | SYSTOLIC BLOOD PRESSURE: 111 MMHG | DIASTOLIC BLOOD PRESSURE: 69 MMHG | TEMPERATURE: 98.5 F | BODY MASS INDEX: 31.22 KG/M2 | HEIGHT: 60 IN

## 2020-02-27 DIAGNOSIS — M17.12 UNILATERAL PRIMARY OSTEOARTHRITIS, LEFT KNEE: ICD-10-CM

## 2020-02-27 PROCEDURE — G0439: CPT

## 2020-02-27 NOTE — HEALTH RISK ASSESSMENT
[Good] : ~his/her~  mood as  good [No] : No [No falls in past year] : Patient reported no falls in the past year [0] : 2) Feeling down, depressed, or hopeless: Not at all (0) [Patient declined PAP Smear] : Patient declined PAP Smear [Patient declined bone density test] : Patient declined bone density test [] : No [[id number]] : 0 [MammogramComments] : not dpne [ColonoscopyComments] : not done

## 2020-02-27 NOTE — PHYSICAL EXAM
[No Acute Distress] : no acute distress [Well-Appearing] : well-appearing [Well Nourished] : well nourished [Well Developed] : well developed [Normal Sclera/Conjunctiva] : normal sclera/conjunctiva [PERRL] : pupils equal round and reactive to light [EOMI] : extraocular movements intact [Normal Outer Ear/Nose] : the outer ears and nose were normal in appearance [Normal Oropharynx] : the oropharynx was normal [No JVD] : no jugular venous distention [No Lymphadenopathy] : no lymphadenopathy [Supple] : supple [Thyroid Normal, No Nodules] : the thyroid was normal and there were no nodules present [No Respiratory Distress] : no respiratory distress  [No Accessory Muscle Use] : no accessory muscle use [Normal Rate] : normal rate  [Clear to Auscultation] : lungs were clear to auscultation bilaterally [Normal S1, S2] : normal S1 and S2 [Regular Rhythm] : with a regular rhythm [No Carotid Bruits] : no carotid bruits [No Murmur] : no murmur heard [No Abdominal Bruit] : a ~M bruit was not heard ~T in the abdomen [No Varicosities] : no varicosities [Pedal Pulses Present] : the pedal pulses are present [No Edema] : there was no peripheral edema [No Palpable Aorta] : no palpable aorta [Soft] : abdomen soft [Non Tender] : non-tender [No Extremity Clubbing/Cyanosis] : no extremity clubbing/cyanosis [No Masses] : no abdominal mass palpated [Non-distended] : non-distended [No HSM] : no HSM [Normal Posterior Cervical Nodes] : no posterior cervical lymphadenopathy [Normal Bowel Sounds] : normal bowel sounds [Normal Anterior Cervical Nodes] : no anterior cervical lymphadenopathy [No CVA Tenderness] : no CVA  tenderness [No Joint Swelling] : no joint swelling [Grossly Normal Strength/Tone] : grossly normal strength/tone [No Spinal Tenderness] : no spinal tenderness [Coordination Grossly Intact] : coordination grossly intact [No Rash] : no rash [No Focal Deficits] : no focal deficits [Normal Gait] : normal gait [Deep Tendon Reflexes (DTR)] : deep tendon reflexes were 2+ and symmetric [Normal Affect] : the affect was normal [Normal Insight/Judgement] : insight and judgment were intact

## 2020-02-27 NOTE — HISTORY OF PRESENT ILLNESS
[FreeTextEntry1] : Has arthritic complaints; Will call orthopedic doctor for injections of knees;  [de-identified] : No Other complaints; Jerod orthopedist

## 2020-05-11 ENCOUNTER — RX RENEWAL (OUTPATIENT)
Age: 70
End: 2020-05-11

## 2020-07-02 ENCOUNTER — APPOINTMENT (OUTPATIENT)
Dept: INTERNAL MEDICINE | Facility: CLINIC | Age: 70
End: 2020-07-02
Payer: MEDICARE

## 2020-07-02 VITALS
SYSTOLIC BLOOD PRESSURE: 112 MMHG | OXYGEN SATURATION: 97 % | WEIGHT: 157 LBS | HEIGHT: 60 IN | BODY MASS INDEX: 30.82 KG/M2 | TEMPERATURE: 99.1 F | DIASTOLIC BLOOD PRESSURE: 69 MMHG | HEART RATE: 87 BPM

## 2020-07-02 PROCEDURE — 99213 OFFICE O/P EST LOW 20 MIN: CPT

## 2020-07-02 NOTE — ASSESSMENT
[FreeTextEntry1] : Near syncope; Concerned it can be cardiac related; Will need to get ECHO and Chest film

## 2020-07-02 NOTE — PHYSICAL EXAM
[No Acute Distress] : no acute distress [Well Nourished] : well nourished [Well Developed] : well developed [Well-Appearing] : well-appearing [Normal Sclera/Conjunctiva] : normal sclera/conjunctiva [EOMI] : extraocular movements intact [PERRL] : pupils equal round and reactive to light [Normal Outer Ear/Nose] : the outer ears and nose were normal in appearance [Normal Oropharynx] : the oropharynx was normal [No JVD] : no jugular venous distention [No Lymphadenopathy] : no lymphadenopathy [Supple] : supple [Thyroid Normal, No Nodules] : the thyroid was normal and there were no nodules present [No Respiratory Distress] : no respiratory distress  [No Accessory Muscle Use] : no accessory muscle use [Clear to Auscultation] : lungs were clear to auscultation bilaterally [Normal Rate] : normal rate  [Regular Rhythm] : with a regular rhythm [Normal S1, S2] : normal S1 and S2 [No Murmur] : no murmur heard [No Carotid Bruits] : no carotid bruits [No Abdominal Bruit] : a ~M bruit was not heard ~T in the abdomen [Pedal Pulses Present] : the pedal pulses are present [No Varicosities] : no varicosities [No Edema] : there was no peripheral edema [No Palpable Aorta] : no palpable aorta [No Extremity Clubbing/Cyanosis] : no extremity clubbing/cyanosis [Soft] : abdomen soft [Non Tender] : non-tender [Non-distended] : non-distended [No Masses] : no abdominal mass palpated [No HSM] : no HSM [Normal Bowel Sounds] : normal bowel sounds [Normal Posterior Cervical Nodes] : no posterior cervical lymphadenopathy [Normal Anterior Cervical Nodes] : no anterior cervical lymphadenopathy [No CVA Tenderness] : no CVA  tenderness [No Spinal Tenderness] : no spinal tenderness [No Joint Swelling] : no joint swelling [Grossly Normal Strength/Tone] : grossly normal strength/tone [No Rash] : no rash [Coordination Grossly Intact] : coordination grossly intact [No Focal Deficits] : no focal deficits [Normal Gait] : normal gait [Deep Tendon Reflexes (DTR)] : deep tendon reflexes were 2+ and symmetric [Normal Affect] : the affect was normal [Normal Insight/Judgement] : insight and judgment were intact

## 2020-07-02 NOTE — HISTORY OF PRESENT ILLNESS
[FreeTextEntry1] : With ambulation gets weak and has to  rest; Also lightheadedness; [de-identified] : As above this has been present for a month; Feels faint at times; \par No chest pain;\par These episodes only happen when going outsise\par Stop smoking in May

## 2020-07-27 ENCOUNTER — FORM ENCOUNTER (OUTPATIENT)
Age: 70
End: 2020-07-27

## 2020-07-28 ENCOUNTER — OUTPATIENT (OUTPATIENT)
Dept: OUTPATIENT SERVICES | Facility: HOSPITAL | Age: 70
LOS: 1 days | End: 2020-07-28
Payer: MEDICARE

## 2020-07-28 DIAGNOSIS — Z41.9 ENCOUNTER FOR PROCEDURE FOR PURPOSES OTHER THAN REMEDYING HEALTH STATE, UNSPECIFIED: Chronic | ICD-10-CM

## 2020-07-28 DIAGNOSIS — Z98.890 OTHER SPECIFIED POSTPROCEDURAL STATES: Chronic | ICD-10-CM

## 2020-07-28 DIAGNOSIS — R55 SYNCOPE AND COLLAPSE: ICD-10-CM

## 2020-07-28 PROCEDURE — 71046 X-RAY EXAM CHEST 2 VIEWS: CPT | Mod: 26

## 2020-07-28 PROCEDURE — 93306 TTE W/DOPPLER COMPLETE: CPT

## 2020-07-28 PROCEDURE — 93306 TTE W/DOPPLER COMPLETE: CPT | Mod: 26

## 2020-07-28 PROCEDURE — 71046 X-RAY EXAM CHEST 2 VIEWS: CPT

## 2020-08-03 ENCOUNTER — RX RENEWAL (OUTPATIENT)
Age: 70
End: 2020-08-03

## 2020-10-21 ENCOUNTER — NON-APPOINTMENT (OUTPATIENT)
Age: 70
End: 2020-10-21

## 2020-11-02 ENCOUNTER — RX RENEWAL (OUTPATIENT)
Age: 70
End: 2020-11-02

## 2020-11-23 ENCOUNTER — RX RENEWAL (OUTPATIENT)
Age: 70
End: 2020-11-23

## 2021-02-19 ENCOUNTER — RX RENEWAL (OUTPATIENT)
Age: 71
End: 2021-02-19

## 2021-03-10 ENCOUNTER — LABORATORY RESULT (OUTPATIENT)
Age: 71
End: 2021-03-10

## 2021-03-10 ENCOUNTER — APPOINTMENT (OUTPATIENT)
Dept: INTERNAL MEDICINE | Facility: CLINIC | Age: 71
End: 2021-03-10
Payer: MEDICARE

## 2021-03-10 VITALS
DIASTOLIC BLOOD PRESSURE: 75 MMHG | HEART RATE: 86 BPM | WEIGHT: 159 LBS | OXYGEN SATURATION: 97 % | BODY MASS INDEX: 31.22 KG/M2 | HEIGHT: 60 IN | TEMPERATURE: 97.8 F | SYSTOLIC BLOOD PRESSURE: 123 MMHG

## 2021-03-10 PROCEDURE — 36415 COLL VENOUS BLD VENIPUNCTURE: CPT

## 2021-03-10 PROCEDURE — G0439: CPT

## 2021-03-10 RX ORDER — CELECOXIB 200 MG/1
200 CAPSULE ORAL TWICE DAILY
Qty: 60 | Refills: 0 | Status: DISCONTINUED | COMMUNITY
Start: 2018-09-25 | End: 2021-03-10

## 2021-03-10 RX ORDER — OMEPRAZOLE 20 MG/1
20 TABLET, DELAYED RELEASE ORAL DAILY
Qty: 30 | Refills: 5 | Status: DISCONTINUED | COMMUNITY
Start: 2018-10-03 | End: 2021-03-10

## 2021-03-10 NOTE — HISTORY OF PRESENT ILLNESS
[de-identified] : 70yoF with pmhx pr-DM, HTN, HLD, chronic cough former smoker who presents for CPE.\par \emily Reports pain in L thigh and hip for the past year. States that chronic low back pain is also worsening. Pain has made it difficult for her to walk. Not taking any meds. Puts Aspercreme with lidocaine onto her thigh. Uses a roller. Had seen PT some time last year and she did not feel that it helped. Does not remember if she's ever had a hip xray. \par \emily Has had both COVID vaccines. Works 1-8 hours a day as a  at her [employer].

## 2021-03-10 NOTE — ASSESSMENT
[FreeTextEntry1] : As above ; Has pain left side in hip area;\par Difficulty walking;\par Will get xrays and all labs\par

## 2021-03-10 NOTE — PHYSICAL EXAM
[Normal] : affect was normal and insight and judgment were intact [de-identified] : L lateral hip TTP, strength 5/5 in b/l LEs

## 2021-03-10 NOTE — PLAN
[FreeTextEntry1] : \par 70yoF with pmhx pr-DM, HTN, HLD, chronic cough former smoker who presents for CPE.\par \par 1. L hip pain\par Progressive for 1 year. Limiting her walking and getting around. Relief with massage.\par - L hip xray ordered\par \par 2. Pre-DM\par - A1c ordered\par \par 3. HTN\par BP at goal 123/75 today.\par - c/w Irbesartan-HCTZ 150-12.5mg\par \par 4. HLD\par - c/w Atorvastatin 10mg\par \par 5. Chronic cough\par - d/c omeprazole, pt has been on it for years but denies any improvement with her cough, more likely 2/2 tobacco use

## 2021-03-10 NOTE — REVIEW OF SYSTEMS
[Joint Pain] : joint pain [Back Pain] : back pain [Unsteady Walking] : ataxia [Negative] : Psychiatric [Shortness Of Breath] : no shortness of breath [Wheezing] : no wheezing [FreeTextEntry6] : chronic cough [FreeTextEntry9] : hip pain, thigh pain, low back pain

## 2021-03-22 DIAGNOSIS — K21.9 GASTRO-ESOPHAGEAL REFLUX DISEASE W/OUT ESOPHAGITIS: ICD-10-CM

## 2021-05-09 NOTE — ASSESSMENT
[FreeTextEntry1] : Swelling of hands and feet;  Rule out systemic problem; Also will get MRI of neck;  Further plans after review of studies non-distended/non-tender

## 2021-05-20 ENCOUNTER — RX RENEWAL (OUTPATIENT)
Age: 71
End: 2021-05-20

## 2021-06-08 ENCOUNTER — APPOINTMENT (OUTPATIENT)
Dept: INTERNAL MEDICINE | Facility: CLINIC | Age: 71
End: 2021-06-08
Payer: MEDICARE

## 2021-06-08 VITALS
BODY MASS INDEX: 30.82 KG/M2 | HEIGHT: 60 IN | SYSTOLIC BLOOD PRESSURE: 123 MMHG | WEIGHT: 157 LBS | TEMPERATURE: 98 F | HEART RATE: 85 BPM | OXYGEN SATURATION: 99 % | DIASTOLIC BLOOD PRESSURE: 73 MMHG

## 2021-06-08 DIAGNOSIS — M50.90 CERVICAL DISC DISORDER, UNSPECIFIED, UNSPECIFIED CERVICAL REGION: ICD-10-CM

## 2021-06-08 PROCEDURE — 36415 COLL VENOUS BLD VENIPUNCTURE: CPT

## 2021-06-08 PROCEDURE — 99213 OFFICE O/P EST LOW 20 MIN: CPT | Mod: 25

## 2021-06-09 NOTE — HISTORY OF PRESENT ILLNESS
[FreeTextEntry1] : Lightheaded \par Mostly  occurs with walking; \par Has been present for a month [de-identified] : Hip had physical therapy with minimal relief\par When she moves head around she gets this feeling of lightheadedness \par Had eye appointment and all okay

## 2021-06-09 NOTE — ASSESSMENT
[FreeTextEntry1] : Patient having these episodes of getting dizzy and lightheaded; \par Will check carotids and also CT of head; \par Also follow up on A1C which has been increased\par Further plans after review of studies

## 2021-06-14 ENCOUNTER — INPATIENT (INPATIENT)
Facility: HOSPITAL | Age: 71
LOS: 2 days | Discharge: HOME | DRG: 247 | End: 2021-06-17
Attending: HOSPITALIST | Admitting: HOSPITALIST
Payer: MEDICARE

## 2021-06-14 VITALS
HEART RATE: 78 BPM | TEMPERATURE: 98 F | WEIGHT: 154.98 LBS | SYSTOLIC BLOOD PRESSURE: 111 MMHG | HEIGHT: 61 IN | OXYGEN SATURATION: 99 % | RESPIRATION RATE: 18 BRPM | DIASTOLIC BLOOD PRESSURE: 57 MMHG

## 2021-06-14 DIAGNOSIS — E78.5 HYPERLIPIDEMIA, UNSPECIFIED: ICD-10-CM

## 2021-06-14 DIAGNOSIS — I10 ESSENTIAL (PRIMARY) HYPERTENSION: ICD-10-CM

## 2021-06-14 DIAGNOSIS — Z98.890 OTHER SPECIFIED POSTPROCEDURAL STATES: Chronic | ICD-10-CM

## 2021-06-14 DIAGNOSIS — R55 SYNCOPE AND COLLAPSE: ICD-10-CM

## 2021-06-14 DIAGNOSIS — R77.8 OTHER SPECIFIED ABNORMALITIES OF PLASMA PROTEINS: ICD-10-CM

## 2021-06-14 DIAGNOSIS — Z41.9 ENCOUNTER FOR PROCEDURE FOR PURPOSES OTHER THAN REMEDYING HEALTH STATE, UNSPECIFIED: Chronic | ICD-10-CM

## 2021-06-14 LAB
ALBUMIN SERPL ELPH-MCNC: 3.7 G/DL — SIGNIFICANT CHANGE UP (ref 3.3–5)
ALP SERPL-CCNC: 80 U/L — SIGNIFICANT CHANGE UP (ref 40–120)
ALT FLD-CCNC: 20 U/L — SIGNIFICANT CHANGE UP (ref 10–45)
ANION GAP SERPL CALC-SCNC: 12 MMOL/L — SIGNIFICANT CHANGE UP (ref 5–17)
APPEARANCE UR: CLEAR — SIGNIFICANT CHANGE UP
APTT BLD: 23.5 SEC — LOW (ref 27.5–35.5)
AST SERPL-CCNC: 20 U/L — SIGNIFICANT CHANGE UP (ref 10–40)
BASOPHILS # BLD AUTO: 0.02 K/UL — SIGNIFICANT CHANGE UP (ref 0–0.2)
BASOPHILS NFR BLD AUTO: 0.2 % — SIGNIFICANT CHANGE UP (ref 0–2)
BILIRUB SERPL-MCNC: 0.5 MG/DL — SIGNIFICANT CHANGE UP (ref 0.2–1.2)
BILIRUB UR-MCNC: NEGATIVE — SIGNIFICANT CHANGE UP
BUN SERPL-MCNC: 17 MG/DL — SIGNIFICANT CHANGE UP (ref 7–23)
CALCIUM SERPL-MCNC: 9.5 MG/DL — SIGNIFICANT CHANGE UP (ref 8.4–10.5)
CHLORIDE SERPL-SCNC: 98 MMOL/L — SIGNIFICANT CHANGE UP (ref 96–108)
CK MB CFR SERPL CALC: 4.8 NG/ML — SIGNIFICANT CHANGE UP (ref 0–6.7)
CK SERPL-CCNC: 96 U/L — SIGNIFICANT CHANGE UP (ref 25–170)
CO2 SERPL-SCNC: 27 MMOL/L — SIGNIFICANT CHANGE UP (ref 22–31)
COLOR SPEC: YELLOW — SIGNIFICANT CHANGE UP
CREAT SERPL-MCNC: 1.01 MG/DL — SIGNIFICANT CHANGE UP (ref 0.5–1.3)
D DIMER BLD IA.RAPID-MCNC: 232 NG/ML DDU — HIGH
DIFF PNL FLD: NEGATIVE — SIGNIFICANT CHANGE UP
EOSINOPHIL # BLD AUTO: 0.01 K/UL — SIGNIFICANT CHANGE UP (ref 0–0.5)
EOSINOPHIL NFR BLD AUTO: 0.1 % — SIGNIFICANT CHANGE UP (ref 0–6)
ESTIMATED AVERAGE GLUCOSE: 137 MG/DL
GLUCOSE SERPL-MCNC: 154 MG/DL — HIGH (ref 70–99)
GLUCOSE UR QL: NEGATIVE — SIGNIFICANT CHANGE UP
HBA1C MFR BLD HPLC: 6.4 %
HCT VFR BLD CALC: 37.5 % — SIGNIFICANT CHANGE UP (ref 34.5–45)
HGB BLD-MCNC: 12.4 G/DL — SIGNIFICANT CHANGE UP (ref 11.5–15.5)
IMM GRANULOCYTES NFR BLD AUTO: 0.5 % — SIGNIFICANT CHANGE UP (ref 0–1.5)
INR BLD: 1.24 — HIGH (ref 0.88–1.16)
KETONES UR-MCNC: NEGATIVE — SIGNIFICANT CHANGE UP
LEUKOCYTE ESTERASE UR-ACNC: NEGATIVE — SIGNIFICANT CHANGE UP
LYMPHOCYTES # BLD AUTO: 0.55 K/UL — LOW (ref 1–3.3)
LYMPHOCYTES # BLD AUTO: 5.5 % — LOW (ref 13–44)
MCHC RBC-ENTMCNC: 30.8 PG — SIGNIFICANT CHANGE UP (ref 27–34)
MCHC RBC-ENTMCNC: 33.1 GM/DL — SIGNIFICANT CHANGE UP (ref 32–36)
MCV RBC AUTO: 93.3 FL — SIGNIFICANT CHANGE UP (ref 80–100)
MONOCYTES # BLD AUTO: 0.36 K/UL — SIGNIFICANT CHANGE UP (ref 0–0.9)
MONOCYTES NFR BLD AUTO: 3.6 % — SIGNIFICANT CHANGE UP (ref 2–14)
NEUTROPHILS # BLD AUTO: 9.04 K/UL — HIGH (ref 1.8–7.4)
NEUTROPHILS NFR BLD AUTO: 90.1 % — HIGH (ref 43–77)
NITRITE UR-MCNC: NEGATIVE — SIGNIFICANT CHANGE UP
NRBC # BLD: 0 /100 WBCS — SIGNIFICANT CHANGE UP (ref 0–0)
PH UR: 6 — SIGNIFICANT CHANGE UP (ref 5–8)
PLATELET # BLD AUTO: 306 K/UL — SIGNIFICANT CHANGE UP (ref 150–400)
POTASSIUM SERPL-MCNC: 3.8 MMOL/L — SIGNIFICANT CHANGE UP (ref 3.5–5.3)
POTASSIUM SERPL-SCNC: 3.8 MMOL/L — SIGNIFICANT CHANGE UP (ref 3.5–5.3)
PROT SERPL-MCNC: 7.8 G/DL — SIGNIFICANT CHANGE UP (ref 6–8.3)
PROT UR-MCNC: NEGATIVE MG/DL — SIGNIFICANT CHANGE UP
PROTHROM AB SERPL-ACNC: 14.7 SEC — HIGH (ref 10.6–13.6)
RBC # BLD: 4.02 M/UL — SIGNIFICANT CHANGE UP (ref 3.8–5.2)
RBC # FLD: 11.8 % — SIGNIFICANT CHANGE UP (ref 10.3–14.5)
SARS-COV-2 RNA SPEC QL NAA+PROBE: NEGATIVE — SIGNIFICANT CHANGE UP
SODIUM SERPL-SCNC: 137 MMOL/L — SIGNIFICANT CHANGE UP (ref 135–145)
SP GR SPEC: 1.02 — SIGNIFICANT CHANGE UP (ref 1–1.03)
TROPONIN T SERPL-MCNC: 0.11 NG/ML — CRITICAL HIGH (ref 0–0.01)
TROPONIN T SERPL-MCNC: 0.14 NG/ML — CRITICAL HIGH (ref 0–0.01)
UROBILINOGEN FLD QL: 0.2 E.U./DL — SIGNIFICANT CHANGE UP
WBC # BLD: 10.03 K/UL — SIGNIFICANT CHANGE UP (ref 3.8–10.5)
WBC # FLD AUTO: 10.03 K/UL — SIGNIFICANT CHANGE UP (ref 3.8–10.5)

## 2021-06-14 PROCEDURE — 99285 EMERGENCY DEPT VISIT HI MDM: CPT

## 2021-06-14 PROCEDURE — 71045 X-RAY EXAM CHEST 1 VIEW: CPT | Mod: 26

## 2021-06-14 PROCEDURE — 70450 CT HEAD/BRAIN W/O DYE: CPT | Mod: 26,MA

## 2021-06-14 PROCEDURE — 93010 ELECTROCARDIOGRAM REPORT: CPT

## 2021-06-14 PROCEDURE — 99223 1ST HOSP IP/OBS HIGH 75: CPT

## 2021-06-14 RX ORDER — GLUCOSAMINE HCL/CHONDROITIN SU 500-400 MG
1 CAPSULE ORAL
Qty: 0 | Refills: 0 | DISCHARGE

## 2021-06-14 RX ORDER — POTASSIUM CHLORIDE 20 MEQ
20 PACKET (EA) ORAL ONCE
Refills: 0 | Status: COMPLETED | OUTPATIENT
Start: 2021-06-14 | End: 2021-06-14

## 2021-06-14 RX ORDER — SENNA PLUS 8.6 MG/1
1 TABLET ORAL
Qty: 0 | Refills: 0 | DISCHARGE

## 2021-06-14 RX ORDER — ATORVASTATIN CALCIUM 80 MG/1
40 TABLET, FILM COATED ORAL AT BEDTIME
Refills: 0 | Status: DISCONTINUED | OUTPATIENT
Start: 2021-06-14 | End: 2021-06-17

## 2021-06-14 RX ORDER — ASPIRIN/CALCIUM CARB/MAGNESIUM 324 MG
324 TABLET ORAL ONCE
Refills: 0 | Status: COMPLETED | OUTPATIENT
Start: 2021-06-14 | End: 2021-06-14

## 2021-06-14 RX ORDER — SENNA PLUS 8.6 MG/1
1 TABLET ORAL AT BEDTIME
Refills: 0 | Status: DISCONTINUED | OUTPATIENT
Start: 2021-06-14 | End: 2021-06-17

## 2021-06-14 RX ORDER — ASPIRIN/CALCIUM CARB/MAGNESIUM 324 MG
81 TABLET ORAL DAILY
Refills: 0 | Status: DISCONTINUED | OUTPATIENT
Start: 2021-06-15 | End: 2021-06-17

## 2021-06-14 RX ORDER — SENNA PLUS 8.6 MG/1
1 TABLET ORAL AT BEDTIME
Refills: 0 | Status: DISCONTINUED | OUTPATIENT
Start: 2021-06-14 | End: 2021-06-14

## 2021-06-14 RX ORDER — IRBESARTAN AND HYDROCHLOROTHIAZIDE 12.5; 3 MG/1; MG/1
0.5 TABLET ORAL
Qty: 0 | Refills: 0 | DISCHARGE

## 2021-06-14 RX ORDER — ASPIRIN/CALCIUM CARB/MAGNESIUM 324 MG
0 TABLET ORAL
Qty: 0 | Refills: 0 | DISCHARGE

## 2021-06-14 RX ADMIN — Medication 20 MILLIEQUIVALENT(S): at 19:53

## 2021-06-14 RX ADMIN — ATORVASTATIN CALCIUM 40 MILLIGRAM(S): 80 TABLET, FILM COATED ORAL at 21:47

## 2021-06-14 RX ADMIN — Medication 324 MILLIGRAM(S): at 16:24

## 2021-06-14 NOTE — H&P ADULT - ASSESSMENT
Patient is a 69 y/o F, with PMHx of HTN, HLD, GERD, who presented to [facility] ED on 06/14/2021 s/p syncopal episode that occurred earlier this morning while blow drying her hair s/p showering. Patient felt dizzy/lightheaded after taking a shower. Patient thought she was going to pass out so lowered herself down to the bathroom floor - states she "blacked out" unable to confirm duration. Patient reports similar intermittent episodes of lightheadedness this past month, without specific trigger. However, does state that she has not been feeling "right" in the humidity.  Patient admitted to cardiology/telemetry for syncope.

## 2021-06-14 NOTE — ED ADULT TRIAGE NOTE - CHIEF COMPLAINT QUOTE
Pt sent to ED by  MD Castillo for evaluation. pt states " I've been feeling light headed and this morning I went to the bathroom and felt like I was going to pass out, I lay on the floor and I think I was out for a few sec or min"  reports "feeling very weak and tired" denies dizziness, unilateral weakness, cp, blurred vision. ekg and FS in progress. Pt sent to ED by  MD Castillo for evaluation. pt states " I've been feeling light headed and this morning I went to the bathroom and felt like I was going to pass out, I laid on the floor and I think I was out for a few sec or min"  reports "feeling very weak and tired" denies dizziness, unilateral weakness, cp, blurred vision. ekg and FS in progress.

## 2021-06-14 NOTE — H&P ADULT - HISTORY OF PRESENT ILLNESS
Patient is a 69 y/o F, with PMHx of HTN, HLD, who presented to [facility] ED on 06/14/2021 s/p syncopal episode that occurred earlier this morning while blow drying her hair s/p showering. Patient felt dizzy/lightheaded after taking a shower. Patient thought she was going to pass out so laid on the bathroom floor - states she "blacked out" unable to confirm duration. Patient reports similar intermittent episodes of lightheadedness this past month, without specific trigger. Patient also endorses a mild headache at the time of lightheadedness. Patient denies CP, SOB, fever, chills, abdominal pain, recent illness, dysuria...    In the ED, VS - T: 97.8 HR: 78 bpm BP: 111/57 mmHg  RR: 18 beats/min spO2: 99% on RA.  Labs significant for: K: 3.8, D-Dimer: 232, Glucose: 154, Troponin: 0.14, COVID: negative, CT head: no acute hemorrhage or ischemia, EKG: NSR @ 79 bpm, no ST-T wave changes.  Patient given Aspirin 325 mg PO x 1.   Patient admitted to cardiology/telemetry for syncope.  Patient is a 69 y/o F, with PMHx of HTN, HLD, GERD, who presented to [facility] ED on 06/14/2021 s/p syncopal episode that occurred earlier this morning while blow drying her hair s/p showering. Patient felt dizzy/lightheaded after taking a shower. Patient thought she was going to pass out so lowered herself down to the bathroom floor - states she "blacked out" unable to confirm duration. Patient reports similar intermittent episodes of lightheadedness this past month, without specific trigger. However, does state that she has not been feeling "right" in the humidity.  Patient also endorses a mild headache at the time of lightheadedness. Patient denies CP, SOB, fever, chills, abdominal pain, recent illness, dysuria., fatigue, recent travel, or sick contacts.     In the ED, VS - T: 97.8 HR: 78 bpm BP: 111/57 mmHg  RR: 18 beats/min spO2: 99% on RA.  Labs significant for: K: 3.8, D-Dimer: 232, Glucose: 154, Troponin: 0.14, COVID: negative, CT head: no acute hemorrhage or ischemia, EKG: NSR @ 79 bpm, no ST-T wave changes.  Patient given Aspirin 325 mg PO x 1.   Patient admitted to cardiology/telemetry for syncope.

## 2021-06-14 NOTE — ED ADULT NURSE NOTE - OBJECTIVE STATEMENT
Patient is a 71yo F, arrived to ED via walk-in, AAOx4, in NAD, VSS, complaining of syncopal episode.  Patient states she became lightheaded after showering, lowered herself to the floor and lost consciousness for unknown period of time.  Patient denies any CP, SOB, diaphoresis, N/V/D, fevers, incontinence, numbness, tingling or any other complaints at this time.  PIV placed, labs drawn and sent, EKG done.

## 2021-06-14 NOTE — ED ADULT NURSE NOTE - CHIEF COMPLAINT QUOTE
Pt sent to ED by  MD Castillo for evaluation. pt states " I've been feeling light headed and this morning I went to the bathroom and felt like I was going to pass out, I lay on the floor and I think I was out for a few sec or min"  reports "feeling very weak and tired" denies dizziness, unilateral weakness, cp, blurred vision. ekg and FS in progress.

## 2021-06-14 NOTE — H&P ADULT - PROBLEM SELECTOR PLAN 3
Hx of htn, home meds:   - Continue Hx of htn,  - Continue irbesartan-hydrochlorothiazide  150-12.5 m.5 mg BID   - Will hold antihypertensives now

## 2021-06-14 NOTE — ED PROVIDER NOTE - OBJECTIVE STATEMENT
history of htn, high cholesterol, here with syncopal episode. Reports she had showered and was standing, blow drying her hair when she felt lightheaded/dizzy. Thought she might pass out so she laid down on bathroom floor and thinks she did black out. Unknown duration. No chest pain, sob, fever, chills. Had mild headache at time of lightheadedness. Reports similar intermittent episodes of lightheadedness past month. Occur randomly, no identified trigger. Saw pmd and scheduled for outpatient ct head but hasn't had yet. Currently feels better.

## 2021-06-14 NOTE — H&P ADULT - NSHPSOCIALHISTORY_GEN_ALL_CORE
ETOH:   Smoking:   Illicit Drugs: ETOH: Denies   Smoking: Current smoker (attempting to quit - wear nicotine patch; cigarette smoker > 30 years)  Illicit Drugs: Denies

## 2021-06-14 NOTE — H&P ADULT - PROBLEM SELECTOR PLAN 1
- Abrupt LOC while drying hair after showering - felt like she was going to "black out" lowered herself to the ground and reports LOC for unknwon amount of time. Patient felt lightheaded and dizzy prior to episode. Reports similar episodes within this past month.    - Head CT Negative for acute hemorrhage or ischemia   - Trop 0.14 , will continue to trend   - EKG: NSR @ 79 bpm, no acute ST-T wave changes   - ECHO ordered  - Continue to monitor on tele   - Patient with slight white shift, will order UA and CXR  - Orthostatics  - TSH - Abrupt LOC while drying hair after showering - felt like she was going to "black out" lowered herself to the ground and reports LOC for unknown amount of time. Patient felt lightheaded and dizzy prior to episode. Reports similar episodes within this past month.    - Head CT Negative for acute hemorrhage or ischemia   - Trop 0.14 , will continue to trend   - EKG: NSR @ 79 bpm, no acute ST-T wave changes   - ECHO ordered  - Continue to monitor on tele;   - Consider tele and EP consults   - Patient with slight white shift, will order UA and CXR  - Orthostatics  - TSH

## 2021-06-14 NOTE — H&P ADULT - PROBLEM SELECTOR PLAN 4
Hx of hld  - F/u fasting lipid panel   - Continue:       F: none   E: K>4, Mg > 2  N: NPO in AM for ischemic eval, DASH/TLC  Dvt:   Dispo: pending clinical progression Hx of hld; home meds: Atorva 10   - F/u fasting lipid panel   - Switch to Atorva 40 if enzymes elevated       F: none   E: K>4, Mg > 2  N: NPO in AM for ischemic eval, DASH/TLC  Dvt: pending Juany elevated    Dispo: pending clinical progression

## 2021-06-14 NOTE — H&P ADULT - NSICDXPASTSURGICALHX_GEN_ALL_CORE_FT
PAST SURGICAL HISTORY:  Elective surgery Rt. wrist-3/2010    Elective surgery fibroid removed -1985    History of hand surgery Right-3/16    History of myomectomy 1985

## 2021-06-14 NOTE — ED PROVIDER NOTE - CLINICAL SUMMARY MEDICAL DECISION MAKING FREE TEXT BOX
here with syncopal episode today. prodrome of lightheadedness, no chest pain. notes intermittent similar episodes, no syncope for about a month. exam non focal, asymptomatic in ed. vitals stable. ecg non ischemic, nsr. labs done with no anemia, but troponin elevated. d dimer added and adge adjusted normal to r/o pe. cxr without infiltrate. asa given. will admit cardiology r/o acs, get echo. discussed with Dr. Joaquin and pmd Dr. Castillo

## 2021-06-14 NOTE — H&P ADULT - PROBLEM SELECTOR PLAN 2
Troponin elevated to 0.14 on arrival, likely post fall  - EKG nonischemic   - Will continue to trend   - Ischemic eval in am if uptrending Troponin elevated to 0.14 on arrival, likely post fall  - EKG nonischemic   - Will continue to trend Juany  - Ischemic eval in am

## 2021-06-14 NOTE — ED PROVIDER NOTE - NEUROLOGICAL, MLM
Alert and oriented, no focal deficits, no motor or sensory deficits. CN 2-12 intact, finger to nose normal, speech normal

## 2021-06-15 DIAGNOSIS — I25.10 ATHEROSCLEROTIC HEART DISEASE OF NATIVE CORONARY ARTERY WITHOUT ANGINA PECTORIS: ICD-10-CM

## 2021-06-15 LAB
A1C WITH ESTIMATED AVERAGE GLUCOSE RESULT: 6.4 % — HIGH (ref 4–5.6)
ALBUMIN SERPL ELPH-MCNC: 3.2 G/DL — LOW (ref 3.3–5)
ALP SERPL-CCNC: 71 U/L — SIGNIFICANT CHANGE UP (ref 40–120)
ALT FLD-CCNC: 21 U/L — SIGNIFICANT CHANGE UP (ref 10–45)
ANION GAP SERPL CALC-SCNC: 10 MMOL/L — SIGNIFICANT CHANGE UP (ref 5–17)
AST SERPL-CCNC: 21 U/L — SIGNIFICANT CHANGE UP (ref 10–40)
BASOPHILS # BLD AUTO: 0.04 K/UL — SIGNIFICANT CHANGE UP (ref 0–0.2)
BASOPHILS NFR BLD AUTO: 0.4 % — SIGNIFICANT CHANGE UP (ref 0–2)
BILIRUB SERPL-MCNC: 0.4 MG/DL — SIGNIFICANT CHANGE UP (ref 0.2–1.2)
BUN SERPL-MCNC: 13 MG/DL — SIGNIFICANT CHANGE UP (ref 7–23)
CALCIUM SERPL-MCNC: 9.2 MG/DL — SIGNIFICANT CHANGE UP (ref 8.4–10.5)
CHLORIDE SERPL-SCNC: 102 MMOL/L — SIGNIFICANT CHANGE UP (ref 96–108)
CHOLEST SERPL-MCNC: 117 MG/DL — SIGNIFICANT CHANGE UP
CO2 SERPL-SCNC: 26 MMOL/L — SIGNIFICANT CHANGE UP (ref 22–31)
COVID-19 SPIKE DOMAIN AB INTERP: POSITIVE
COVID-19 SPIKE DOMAIN ANTIBODY RESULT: >250 U/ML — HIGH
CREAT SERPL-MCNC: 0.81 MG/DL — SIGNIFICANT CHANGE UP (ref 0.5–1.3)
EOSINOPHIL # BLD AUTO: 0.04 K/UL — SIGNIFICANT CHANGE UP (ref 0–0.5)
EOSINOPHIL NFR BLD AUTO: 0.4 % — SIGNIFICANT CHANGE UP (ref 0–6)
ESTIMATED AVERAGE GLUCOSE: 137 MG/DL — HIGH (ref 68–114)
GLUCOSE SERPL-MCNC: 121 MG/DL — HIGH (ref 70–99)
HCT VFR BLD CALC: 35.8 % — SIGNIFICANT CHANGE UP (ref 34.5–45)
HCV AB S/CO SERPL IA: 0.03 S/CO — SIGNIFICANT CHANGE UP
HCV AB SERPL-IMP: SIGNIFICANT CHANGE UP
HDLC SERPL-MCNC: 39 MG/DL — LOW
HGB BLD-MCNC: 11.7 G/DL — SIGNIFICANT CHANGE UP (ref 11.5–15.5)
IMM GRANULOCYTES NFR BLD AUTO: 0.7 % — SIGNIFICANT CHANGE UP (ref 0–1.5)
LIPID PNL WITH DIRECT LDL SERPL: 64 MG/DL — SIGNIFICANT CHANGE UP
LYMPHOCYTES # BLD AUTO: 1.16 K/UL — SIGNIFICANT CHANGE UP (ref 1–3.3)
LYMPHOCYTES # BLD AUTO: 12.7 % — LOW (ref 13–44)
MAGNESIUM SERPL-MCNC: 2.2 MG/DL — SIGNIFICANT CHANGE UP (ref 1.6–2.6)
MCHC RBC-ENTMCNC: 29.7 PG — SIGNIFICANT CHANGE UP (ref 27–34)
MCHC RBC-ENTMCNC: 32.7 GM/DL — SIGNIFICANT CHANGE UP (ref 32–36)
MCV RBC AUTO: 90.9 FL — SIGNIFICANT CHANGE UP (ref 80–100)
MONOCYTES # BLD AUTO: 0.63 K/UL — SIGNIFICANT CHANGE UP (ref 0–0.9)
MONOCYTES NFR BLD AUTO: 6.9 % — SIGNIFICANT CHANGE UP (ref 2–14)
NEUTROPHILS # BLD AUTO: 7.23 K/UL — SIGNIFICANT CHANGE UP (ref 1.8–7.4)
NEUTROPHILS NFR BLD AUTO: 78.9 % — HIGH (ref 43–77)
NON HDL CHOLESTEROL: 78 MG/DL — SIGNIFICANT CHANGE UP
NRBC # BLD: 0 /100 WBCS — SIGNIFICANT CHANGE UP (ref 0–0)
PLATELET # BLD AUTO: 307 K/UL — SIGNIFICANT CHANGE UP (ref 150–400)
POTASSIUM SERPL-MCNC: 4.2 MMOL/L — SIGNIFICANT CHANGE UP (ref 3.5–5.3)
POTASSIUM SERPL-SCNC: 4.2 MMOL/L — SIGNIFICANT CHANGE UP (ref 3.5–5.3)
PROT SERPL-MCNC: 7.4 G/DL — SIGNIFICANT CHANGE UP (ref 6–8.3)
RBC # BLD: 3.94 M/UL — SIGNIFICANT CHANGE UP (ref 3.8–5.2)
RBC # FLD: 11.7 % — SIGNIFICANT CHANGE UP (ref 10.3–14.5)
SARS-COV-2 IGG+IGM SERPL QL IA: >250 U/ML — HIGH
SARS-COV-2 IGG+IGM SERPL QL IA: POSITIVE
SODIUM SERPL-SCNC: 138 MMOL/L — SIGNIFICANT CHANGE UP (ref 135–145)
TRIGL SERPL-MCNC: 72 MG/DL — SIGNIFICANT CHANGE UP
TROPONIN T SERPL-MCNC: 0.17 NG/ML — CRITICAL HIGH (ref 0–0.01)
TROPONIN T SERPL-MCNC: 0.17 NG/ML — CRITICAL HIGH (ref 0–0.01)
TSH SERPL-MCNC: 1.18 UIU/ML — SIGNIFICANT CHANGE UP (ref 0.27–4.2)
WBC # BLD: 9.16 K/UL — SIGNIFICANT CHANGE UP (ref 3.8–10.5)
WBC # FLD AUTO: 9.16 K/UL — SIGNIFICANT CHANGE UP (ref 3.8–10.5)

## 2021-06-15 PROCEDURE — 93306 TTE W/DOPPLER COMPLETE: CPT | Mod: 26

## 2021-06-15 PROCEDURE — 99232 SBSQ HOSP IP/OBS MODERATE 35: CPT | Mod: GC

## 2021-06-15 PROCEDURE — 99233 SBSQ HOSP IP/OBS HIGH 50: CPT

## 2021-06-15 PROCEDURE — 75574 CT ANGIO HRT W/3D IMAGE: CPT | Mod: 26

## 2021-06-15 RX ORDER — METOPROLOL TARTRATE 50 MG
25 TABLET ORAL DAILY
Refills: 0 | Status: DISCONTINUED | OUTPATIENT
Start: 2021-06-16 | End: 2021-06-17

## 2021-06-15 RX ORDER — CLOPIDOGREL BISULFATE 75 MG/1
600 TABLET, FILM COATED ORAL ONCE
Refills: 0 | Status: COMPLETED | OUTPATIENT
Start: 2021-06-15 | End: 2021-06-15

## 2021-06-15 RX ORDER — SODIUM CHLORIDE 9 MG/ML
250 INJECTION INTRAMUSCULAR; INTRAVENOUS; SUBCUTANEOUS ONCE
Refills: 0 | Status: COMPLETED | OUTPATIENT
Start: 2021-06-15 | End: 2021-06-15

## 2021-06-15 RX ORDER — SODIUM CHLORIDE 9 MG/ML
1000 INJECTION INTRAMUSCULAR; INTRAVENOUS; SUBCUTANEOUS
Refills: 0 | Status: DISCONTINUED | OUTPATIENT
Start: 2021-06-16 | End: 2021-06-16

## 2021-06-15 RX ORDER — SODIUM CHLORIDE 9 MG/ML
500 INJECTION INTRAMUSCULAR; INTRAVENOUS; SUBCUTANEOUS
Refills: 0 | Status: DISCONTINUED | OUTPATIENT
Start: 2021-06-15 | End: 2021-06-16

## 2021-06-15 RX ORDER — ATORVASTATIN CALCIUM 80 MG/1
10 TABLET, FILM COATED ORAL ONCE
Refills: 0 | Status: COMPLETED | OUTPATIENT
Start: 2021-06-15 | End: 2021-06-15

## 2021-06-15 RX ORDER — HEPARIN SODIUM 5000 [USP'U]/ML
5000 INJECTION INTRAVENOUS; SUBCUTANEOUS EVERY 8 HOURS
Refills: 0 | Status: DISCONTINUED | OUTPATIENT
Start: 2021-06-15 | End: 2021-06-17

## 2021-06-15 RX ORDER — CLOPIDOGREL BISULFATE 75 MG/1
75 TABLET, FILM COATED ORAL DAILY
Refills: 0 | Status: DISCONTINUED | OUTPATIENT
Start: 2021-06-16 | End: 2021-06-17

## 2021-06-15 RX ORDER — METOPROLOL TARTRATE 50 MG
25 TABLET ORAL ONCE
Refills: 0 | Status: COMPLETED | OUTPATIENT
Start: 2021-06-15 | End: 2021-06-15

## 2021-06-15 RX ADMIN — ATORVASTATIN CALCIUM 10 MILLIGRAM(S): 80 TABLET, FILM COATED ORAL at 22:27

## 2021-06-15 RX ADMIN — HEPARIN SODIUM 5000 UNIT(S): 5000 INJECTION INTRAVENOUS; SUBCUTANEOUS at 22:27

## 2021-06-15 RX ADMIN — SODIUM CHLORIDE 250 MILLILITER(S): 9 INJECTION INTRAMUSCULAR; INTRAVENOUS; SUBCUTANEOUS at 14:43

## 2021-06-15 RX ADMIN — Medication 81 MILLIGRAM(S): at 11:02

## 2021-06-15 RX ADMIN — CLOPIDOGREL BISULFATE 600 MILLIGRAM(S): 75 TABLET, FILM COATED ORAL at 18:30

## 2021-06-15 RX ADMIN — SODIUM CHLORIDE 75 MILLILITER(S): 9 INJECTION INTRAMUSCULAR; INTRAVENOUS; SUBCUTANEOUS at 14:43

## 2021-06-15 RX ADMIN — Medication 25 MILLIGRAM(S): at 13:33

## 2021-06-15 NOTE — PROGRESS NOTE ADULT - PROBLEM SELECTOR PLAN 1
- Abrupt LOC while drying hair after showering - felt like she was going to "black out" lowered herself to the ground and reports LOC for unknown amount of time. Patient felt lightheaded and dizzy prior to episode. Reports similar episodes within this past month.    - Head CT Negative for acute hemorrhage or ischemia   - Trop 0.14 >0.11> 0.17; f/u 2:30 pm trop  - EKG: NSR @ 79 bpm, no acute ST-T wave changes   - ECHO 6/15: revealed  LVEF 50-55%. Basal and mid inferior septum and basal anteroseptal segment are hypokinetic.  Compared to the previous TTE performed on 7/28/2020, there are new wall motion abnormalities.  - NPO for CCTA; f/u  - carotid US ordered. f.u  - Continue to monitor on tele;   -UA negative, cxr normal;   - Orthostatics +; ordered for  cc bolus X 1.

## 2021-06-15 NOTE — PROGRESS NOTE ADULT - SUBJECTIVE AND OBJECTIVE BOX
Precath Checklist    69 y/o F, with PMHx of HTN, HLD, GERD, who is admitted to cardiology/telemetry for syncope. Trop T plateuaed to 0.17; ECHO with wall motion abnormalities, CCTA revealing severe to subtotal stenosis of mRCA, ischema of inferoseptal wall noted. Pt NPO after MN for cardiac cath; pt. precath/consneted with Dr. Angel 6/16/21 (email sent to [email or web address] to add patient to schedule) .      Allergies:NKDA       Shellfish/Contrast Allergies?  + shellfish allergy (rxn: rash); denies any contrast allergy                            11.7   9.16  )-----------( 307      ( 15 Graeme 2021 08:21 )             35.8     06-15    138  |  102  |  13  ----------------------------<  121<H>  4.2   |  26  |  0.81    Ca    9.2      15 Graeme 2021 08:20  Mg     2.2     06-15    TPro  7.4  /  Alb  3.2<L>  /  TBili  0.4  /  DBili  x   /  AST  21  /  ALT  21  /  AlkPhos  71  06-15    CARDIAC MARKERS ( 15 Graeme 2021 15:33 )  x     / 0.17 ng/mL / x     / x     / x      CARDIAC MARKERS ( 15 Graeme 2021 08:20 )  x     / 0.17 ng/mL / x     / x     / x      CARDIAC MARKERS ( 14 Jun 2021 19:38 )  x     / 0.11 ng/mL / 96 U/L / x     / 4.8 ng/mL  CARDIAC MARKERS ( 14 Jun 2021 14:25 )  x     / 0.14 ng/mL / x     / x     / x          Prothrombin Time, Plasma: 14.7 sec [10.6 - 13.6] (06-14-21 @ 15:36)  INR: 1.24 [0.88 - 1.16] (06-14-21 @ 15:36)  Activated Partial Thromboplastin Time: 23.5 sec [27.5 - 35.5] (06-14-21 @ 15:36)      COVID Result within 48-72hours:   COVID-19 PCR: Negative (06-14-21 @ 15:36)      COVID VACCINATED: YES ; recieved 2 dose of Pfizer (last dose 02/2021)  HCG results: Not indicated 2/2 post menopause      EKG:  	    ASA _3				Mallampati class: ___3______	            Anginal Class: ______3___      Sedation Plan:   [  ] None   [  ] Moderate   [  ]  Deep    [  ]  General Anesthesia   Patient Is Suitable Candidate For Sedation?     [ X ] Yes   [  ] No   [  ] Not Applicable   Cath Order Entered: [X  ] Yes  DAPT LOAD Ordered: [ X ] Yes    Pre-Cath fluids Ordered: [X  ] Yes      Risks Benefits Annotation:     CARDIAC CATH: Risks & benefits of procedure and sedation and risks and benefits for the alternative therapy have been explained to the patient and/or HCP in layman’s terms including but not limited to: allergic reaction, bleeding, infection, arrhythmia, respiratory compromise, renal and vascular compromise, limb damage, MI, CVA, emergent CABG/Vascular Surgery and death. Informed consent obtained and in chart.

## 2021-06-15 NOTE — PROGRESS NOTE ADULT - ATTENDING COMMENTS
70F, former smoker, HTN, HLD p/w syncope, elevated troponins, admitted to cardiac tele     -CAD - s/p CCTA w/ severe/subtotal mRCA disease, positive trops. TTE: LVEF 50-55%, mid-inferoseptum and basal anteroseptal hypokinesis, Normal RV function, no significant valvular disease. Trop trend flat, currently CP free and HD stable; c/w ASA, load Plavix and c/w Lipitor; Plan for [facility] tmrw  -Syncope - likely Vagal event 2/2 symptomatic RCA disease; No evidence of conduction disease on Tele; No significant structural/valvular disease on echo; f/u Carotid dopplers; CT Head negative for acute infarct/hemorrhage/mass  -DASH diet; NPO after MN  -DVT PPx  -Dispo: Cardiac Tele    Mary Ann Joaquin MD  Cardiology Attending

## 2021-06-15 NOTE — PHYSICAL THERAPY INITIAL EVALUATION ADULT - GENERAL OBSERVATIONS, REHAB EVAL
Pt received seated in chair +hep lock +tele. PT received consent to treat from MIK Johnson. Pt is independent and at baseline, pt to be d/c from IP PT services to optimize function until discharge. PT to follow up.

## 2021-06-15 NOTE — PROGRESS NOTE ADULT - PROBLEM SELECTOR PLAN 4
Hx of hld; home meds: Atorva 10   - HDL 39  - started on lipitor 40 mg daily        Dvt: Hep SubQ  Dispo: f/u CCTA, carotid US    Case d/w Dr. Joaquin

## 2021-06-15 NOTE — PROGRESS NOTE ADULT - PROBLEM SELECTOR PLAN 3
-  holding home irbesartan-hydrochlorothiazide  150-12.5 m.5 mg BID   -  received lopressor 25 mg PO X 1 prior to CCTA today

## 2021-06-15 NOTE — PROGRESS NOTE ADULT - SUBJECTIVE AND OBJECTIVE BOX
INTERVAL HPI/OVERNIGHT EVENTS:  Patient well known to me from office; Sent to ED after Syncopal episode at home; Has been feeling dizzy for a number of days;  All studies noted      MEDICATIONS  (STANDING):  aspirin enteric coated 81 milliGRAM(s) Oral daily  atorvastatin 40 milliGRAM(s) Oral at bedtime  clopidogrel Tablet 600 milliGRAM(s) Oral once  heparin   Injectable 5000 Unit(s) SubCutaneous every 8 hours  senna 1 Tablet(s) Oral at bedtime  sodium chloride 0.9%. 500 milliLiter(s) (75 mL/Hr) IV Continuous <Continuous>    MEDICATIONS  (PRN):      Allergies    No Known Drug Allergies  shellfish (Rash)    Intolerances        Vital Signs Last 24 Hrs  T(C): 36.4 (15 Graeme 2021 14:06), Max: 37.1 (15 Graeme 2021 09:01)  T(F): 97.6 (15 Graeme 2021 14:06), Max: 98.7 (15 Graeme 2021 09:01)  HR: 73 (15 Graeme 2021 17:57) (73 - 88)  BP: 147/68 (15 Graeme 2021 17:57) (120/83 - 164/69)  BP(mean): --  RR: 16 (15 Graeme 2021 17:57) (16 - 20)  SpO2: 97% (15 Graeme 2021 17:57) (97% - 100%)          Constitutional: Awake    Eyes: PABLITO    ENMT: Negative    Neck: Supple    Back:  no tenderness     Respiratory:  clear    Cardiovascular: S1 S2    Gastrointestinal: soft    Genitourinary:    Extremities: no edema    Vascular:    Neurological:    Skin:    Lymph Nodes:            15 @ 07:01  -  -15 @ 18:12  --------------------------------------------------------  IN: 0 mL / OUT: 0 mL / NET: 0 mL      LABS:                        11.7   9.16  )-----------( 307      ( 15 Graeme 2021 08:21 )             35.8     06-15    138  |  102  |  13  ----------------------------<  121<H>  4.2   |  26  |  0.81    Ca    9.2      15 Graeme 2021 08:20  Mg     2.2     06-15    TPro  7.4  /  Alb  3.2<L>  /  TBili  0.4  /  DBili  x   /  AST  21  /  ALT  21  /  AlkPhos  71  06-15    PT/INR - ( 2021 15:36 )   PT: 14.7 sec;   INR: 1.24          PTT - ( 2021 15:36 )  PTT:23.5 sec  Urinalysis Basic - ( 2021 19:00 )    Color: Yellow / Appearance: Clear / S.020 / pH: x  Gluc: x / Ketone: NEGATIVE  / Bili: Negative / Urobili: 0.2 E.U./dL   Blood: x / Protein: NEGATIVE mg/dL / Nitrite: NEGATIVE   Leuk Esterase: NEGATIVE / RBC: x / WBC x   Sq Epi: x / Non Sq Epi: x / Bacteria: x        RADIOLOGY & ADDITIONAL TESTS:

## 2021-06-15 NOTE — PHYSICAL THERAPY INITIAL EVALUATION ADULT - PERTINENT HX OF CURRENT PROBLEM, REHAB EVAL
Patient is a 71 y/o F, with PMHx of HTN, HLD, GERD, who presented to [facility] ED on 06/14/2021 s/p syncopal episode that occurred earlier this morning while blow drying her hair s/p showering. Patient felt dizzy/lightheaded after taking a shower. Patient thought she was going to pass out so lowered herself down to the bathroom floor - states she "blacked out" unable to confirm duration.  Patient admitted to cardiology/telemetry for syncope.

## 2021-06-15 NOTE — PROGRESS NOTE ADULT - SUBJECTIVE AND OBJECTIVE BOX
Interventional Cardiology PA Adult Progress Note    CC: Syncope  Subjective Assessment: pt. seen and examined at bedside today am. Pt. comfortable; denies any chest pain, SOB, dizziness, palpitation, N/V,     ROS neg except as per subjective HPi,   	  MEDICATIONS:        senna 1 Tablet(s) Oral at bedtime  atorvastatin 40 milliGRAM(s) Oral at bedtime  aspirin enteric coated 81 milliGRAM(s) Oral daily  sodium chloride 0.9%. 500 milliLiter(s) IV Continuous <Continuous>      	    [PHYSICAL EXAM:  TELEMETRY:  T(C): 36.4 (06-15-21 @ 14:06), Max: 37.1 (06-15-21 @ 09:01)  HR: 84 (06-15-21 @ 13:33) (73 - 88)  BP: 164/69 (06-15-21 @ 13:33) (111/57 - 164/69)  RR: 18 (06-15-21 @ 13:33) (17 - 20)  SpO2: 99% (06-15-21 @ 13:33) (97% - 100%)  Wt(kg): --  I&O's Summary    15 Graeme 2021 07:01  -  15 Graeme 2021 14:16  --------------------------------------------------------  IN: 0 mL / OUT: 0 mL / NET: 0 mL      Height (cm): 154.9 (06-14 @ 17:34)  Weight (kg): 70.3 (06-14 @ 17:34)  BMI (kg/m2): 29.3 (06-14 @ 17:34)  BSA (m2): 1.69 (06-14 @ 17:34)  Rollins:  Central/PICC/Mid Line:                                         Gen: NAD  neck: no JVD  CV: RRR, s1 and s2 positive, no murmurs noted  Pulm: CTA B/L; no w/r/r  GI: soft, NT/ND; + BS x 4  extremities: no clubbing, cyanosis and edema noted b/l  Neuro: AO X 3, no focal deficits appreciated       	    ECG:  	  RADIOLOGY:   DIAGNOSTIC TESTING:  [ ] Echocardiogram:  [ ]  Catheterization:  [ ] Stress Test:    [ ] ADALBERTO  OTHER: 	    LABS:	 	  CARDIAC MARKERS:                                  11.7   9.16  )-----------( 307      ( 15 Graeme 2021 08:21 )             35.8     06-15    138  |  102  |  13  ----------------------------<  121<H>  4.2   |  26  |  0.81    Ca    9.2      15 Graeme 2021 08:20  Mg     2.2     06-15    TPro  7.4  /  Alb  3.2<L>  /  TBili  0.4  /  DBili  x   /  AST  21  /  ALT  21  /  AlkPhos  71  06-15    proBNP:   Lipid Profile:   HgA1c:   TSH: Thyroid Stimulating Hormone, Serum: 1.180 uIU/mL (06-15 @ 08:20)    PT/INR - ( 14 Jun 2021 15:36 )   PT: 14.7 sec;   INR: 1.24          PTT - ( 14 Jun 2021 15:36 )  PTT:23.5 sec    ASSESSMENT/PLAN: 	        DVT ppx:  Dispo:

## 2021-06-16 LAB
ANION GAP SERPL CALC-SCNC: 10 MMOL/L — SIGNIFICANT CHANGE UP (ref 5–17)
BUN SERPL-MCNC: 14 MG/DL — SIGNIFICANT CHANGE UP (ref 7–23)
CALCIUM SERPL-MCNC: 8.8 MG/DL — SIGNIFICANT CHANGE UP (ref 8.4–10.5)
CHLORIDE SERPL-SCNC: 103 MMOL/L — SIGNIFICANT CHANGE UP (ref 96–108)
CO2 SERPL-SCNC: 25 MMOL/L — SIGNIFICANT CHANGE UP (ref 22–31)
CREAT SERPL-MCNC: 0.98 MG/DL — SIGNIFICANT CHANGE UP (ref 0.5–1.3)
CULTURE RESULTS: SIGNIFICANT CHANGE UP
GLUCOSE SERPL-MCNC: 112 MG/DL — HIGH (ref 70–99)
HCT VFR BLD CALC: 32.5 % — LOW (ref 34.5–45)
HGB BLD-MCNC: 10.3 G/DL — LOW (ref 11.5–15.5)
INR BLD: 1.22 — HIGH (ref 0.88–1.16)
MAGNESIUM SERPL-MCNC: 2 MG/DL — SIGNIFICANT CHANGE UP (ref 1.6–2.6)
MCHC RBC-ENTMCNC: 30 PG — SIGNIFICANT CHANGE UP (ref 27–34)
MCHC RBC-ENTMCNC: 31.7 GM/DL — LOW (ref 32–36)
MCV RBC AUTO: 94.8 FL — SIGNIFICANT CHANGE UP (ref 80–100)
NRBC # BLD: 0 /100 WBCS — SIGNIFICANT CHANGE UP (ref 0–0)
PLATELET # BLD AUTO: 277 K/UL — SIGNIFICANT CHANGE UP (ref 150–400)
POTASSIUM SERPL-MCNC: 4.6 MMOL/L — SIGNIFICANT CHANGE UP (ref 3.5–5.3)
POTASSIUM SERPL-SCNC: 4.6 MMOL/L — SIGNIFICANT CHANGE UP (ref 3.5–5.3)
PROTHROM AB SERPL-ACNC: 14.5 SEC — HIGH (ref 10.6–13.6)
RBC # BLD: 3.43 M/UL — LOW (ref 3.8–5.2)
RBC # FLD: 11.7 % — SIGNIFICANT CHANGE UP (ref 10.3–14.5)
SODIUM SERPL-SCNC: 138 MMOL/L — SIGNIFICANT CHANGE UP (ref 135–145)
SPECIMEN SOURCE: SIGNIFICANT CHANGE UP
WBC # BLD: 6.42 K/UL — SIGNIFICANT CHANGE UP (ref 3.8–10.5)
WBC # FLD AUTO: 6.42 K/UL — SIGNIFICANT CHANGE UP (ref 3.8–10.5)

## 2021-06-16 PROCEDURE — 99152 MOD SED SAME PHYS/QHP 5/>YRS: CPT

## 2021-06-16 PROCEDURE — 93458 L HRT ARTERY/VENTRICLE ANGIO: CPT | Mod: 26,59

## 2021-06-16 PROCEDURE — 93010 ELECTROCARDIOGRAM REPORT: CPT

## 2021-06-16 PROCEDURE — 99233 SBSQ HOSP IP/OBS HIGH 50: CPT

## 2021-06-16 PROCEDURE — 93880 EXTRACRANIAL BILAT STUDY: CPT | Mod: 26

## 2021-06-16 PROCEDURE — 92941 PRQ TRLML REVSC TOT OCCL AMI: CPT | Mod: RC

## 2021-06-16 RX ORDER — HYDROCORTISONE 20 MG
200 TABLET ORAL ONCE
Refills: 0 | Status: COMPLETED | OUTPATIENT
Start: 2021-06-16 | End: 2021-06-16

## 2021-06-16 RX ORDER — SODIUM CHLORIDE 9 MG/ML
500 INJECTION INTRAMUSCULAR; INTRAVENOUS; SUBCUTANEOUS
Refills: 0 | Status: DISCONTINUED | OUTPATIENT
Start: 2021-06-16 | End: 2021-06-17

## 2021-06-16 RX ORDER — DIPHENHYDRAMINE HCL 50 MG
50 CAPSULE ORAL ONCE
Refills: 0 | Status: DISCONTINUED | OUTPATIENT
Start: 2021-06-16 | End: 2021-06-17

## 2021-06-16 RX ADMIN — Medication 25 MILLIGRAM(S): at 12:51

## 2021-06-16 RX ADMIN — HEPARIN SODIUM 5000 UNIT(S): 5000 INJECTION INTRAVENOUS; SUBCUTANEOUS at 22:03

## 2021-06-16 RX ADMIN — ATORVASTATIN CALCIUM 40 MILLIGRAM(S): 80 TABLET, FILM COATED ORAL at 22:03

## 2021-06-16 RX ADMIN — SODIUM CHLORIDE 75 MILLILITER(S): 9 INJECTION INTRAMUSCULAR; INTRAVENOUS; SUBCUTANEOUS at 19:39

## 2021-06-16 RX ADMIN — Medication 200 MILLIGRAM(S): at 17:04

## 2021-06-16 RX ADMIN — Medication 81 MILLIGRAM(S): at 06:22

## 2021-06-16 RX ADMIN — SODIUM CHLORIDE 75 MILLILITER(S): 9 INJECTION INTRAMUSCULAR; INTRAVENOUS; SUBCUTANEOUS at 06:22

## 2021-06-16 RX ADMIN — HEPARIN SODIUM 5000 UNIT(S): 5000 INJECTION INTRAVENOUS; SUBCUTANEOUS at 06:22

## 2021-06-16 RX ADMIN — CLOPIDOGREL BISULFATE 75 MILLIGRAM(S): 75 TABLET, FILM COATED ORAL at 06:22

## 2021-06-16 NOTE — PROGRESS NOTE ADULT - PROBLEM SELECTOR PLAN 1
- Abrupt LOC while drying hair after showering - felt like she was going to "black out" lowered herself to the ground and reports LOC for unknown amount of time. Patient felt lightheaded and dizzy prior to episode. Reports similar episodes within this past month.    - Head CT Negative for acute hemorrhage or ischemia   - Trop 0.14 , will continue to trend   - EKG: NSR @ 79 bpm, no acute ST-T wave changes   - ECHO ordered  - Continue to monitor on tele;   - Consider tele and EP consults   - Patient with slight white shift, will order UA and CXR  - Orthostatics  - TSH - Likely vasovagal, Abrupt LOC while drying hair after showering - felt like she was going to "black out" lowered herself to the ground and reports LOC for unknown amount of time. Patient felt lightheaded and dizzy prior to episode. Reports similar episodes within this past month.    -Head CT Negative for acute hemorrhage or ischemia    -Trops  peaked 0.17  -EKG: NSR @ 79 bpm, no acute ST-T wave changes   -CXR normal  -ECHO 6/15: revealed  LVEF 50-55%. Basal and mid inferior septum and basal anteroseptal segment are hypokinetic.  Compared to the previous TTE performed on 7/28/2020, there are new wall motion abnormalities.  -CCTA 6/15/21 : calcium score 187, Severe to subtotal stenosis of mid RCA, Ischemia of Inferoseptal wall noted, otherwise normal LVEF  -Carotid US done. F/u results  -UA negative, UCx insignificant amounts  -Orthostatics + on admission; received  cc bolus X 1 and NS @ 75 cc/hr X 4 hr. F/U repeat orthostatics  -Patient with slight white shift, now normalized.  UA negative, CXR clear, remains febrile  -TSH WNL  -monitor on tele

## 2021-06-16 NOTE — PROGRESS NOTE ADULT - PROBLEM SELECTOR PLAN 4
Hx of hld; home meds: Atorva 10   - F/u fasting lipid panel   - Switch to Atorva 40 if enzymes elevated       F: none   E: K>4, Mg > 2  N: NPO in AM for ischemic eval, DASH/TLC  Dvt: pending Juany elevated    Dispo: pending clinical progression On home Atorvastatin 10mg daily, now increased to Atorvastatin 40mg daily (pt refused increased dose, counselled on the need to take high intensity statin if CAD)  -Lipid Panel WNL       F: none   E: K>4, Mg > 2  N: NPO for cath, DASH/TLC    Dispo: pending clinical progression

## 2021-06-16 NOTE — PROGRESS NOTE ADULT - SUBJECTIVE AND OBJECTIVE BOX
incomplete    S: Pt seen and examined bedside, found,   Patient denies C/P, SOB, N/V, dizziness, palpitations, and diaphoresis.  Pt denies fever/chills, dysuria, abdominal pain, diarrhea, and cough  12 Point ROS otherwise negative except as per HPI/subjective.     O: Vital Signs Last 24 Hrs  T(C): 36.7 (16 Jun 2021 12:44), Max: 37.1 (16 Jun 2021 09:56)  T(F): 98.1 (16 Jun 2021 12:44), Max: 98.8 (16 Jun 2021 09:56)  HR: 75 (16 Jun 2021 12:49) (66 - 90)  BP: 144/64 (16 Jun 2021 12:49) (102/50 - 147/68)  BP(mean): 50 (16 Jun 2021 09:33) (50 - 50)  RR: 18 (16 Jun 2021 12:49) (16 - 18)  SpO2: 95% (16 Jun 2021 08:18) (95% - 97%)    PHYSICAL EXAM:  GEN: NAD  HEENT: No JVD  PULM:  CTA B/L  CARD:  RRR, S1 and S2   ABD: +BS, NT, soft/ND	  EXT: No Edema B/L LE  NEURO: A+Ox3, no focal deficit  PSYCH: Mood Appropriate    LABS:                        10.3   6.42  )-----------( 277      ( 16 Jun 2021 06:08 )             32.5     06-16    138  |  103  |  14  ----------------------------<  112<H>  4.6   |  25  |  0.98    Ca    8.8      16 Jun 2021 06:08  Mg     2.0     06-16    TPro  7.4  /  Alb  3.2<L>  /  TBili  0.4  /  DBili  x   /  AST  21  /  ALT  21  /  AlkPhos  71  06-15    PT/INR - ( 16 Jun 2021 06:08 )   PT: 14.5 sec;   INR: 1.22          PTT - ( 14 Jun 2021 15:36 )  PTT:23.5 sec  Troponin T, Serum: 0.17 ng/mL (06-15-21 @ 15:33)  Troponin T, Serum: 0.17 ng/mL (06-15-21 @ 08:20)  Troponin T, Serum: 0.11 ng/mL (06-14-21 @ 19:38)  Troponin T, Serum: 0.14 ng/mL (06-14-21 @ 14:25)      06-15 @ 07:01  -  06-16 @ 07:00  --------------------------------------------------------  IN: 180 mL / OUT: 0 mL / NET: 180 mL      Daily     Daily    S: Pt seen and examined bedside, found, NAD, HD stable. Pt denies C/P, SOB, N/V, dizziness, palpitations, and diaphoresis or any other complaints at this time.  She did that endorsed that she wants to leave the hospital tonight even if PCI done.  Pt made aware that according to [facility] policy, that she is required to stay overnight for monitoring post cath with PCI    12 Point ROS otherwise negative except as per HPI/subjective.     O: Vital Signs Last 24 Hrs  T(C): 36.7 (16 Jun 2021 12:44), Max: 37.1 (16 Jun 2021 09:56)  T(F): 98.1 (16 Jun 2021 12:44), Max: 98.8 (16 Jun 2021 09:56)  HR: 75 (16 Jun 2021 12:49) (66 - 90)  BP: 144/64 (16 Jun 2021 12:49) (102/50 - 147/68)  BP(mean): 50 (16 Jun 2021 09:33) (50 - 50)  RR: 18 (16 Jun 2021 12:49) (16 - 18)  SpO2: 95% (16 Jun 2021 08:18) (95% - 97%)    PHYSICAL EXAM:  GEN: NAD  HEENT: Supple, no JVD B/L  PULM:  CTA B/L, no RRW B/L  CARD:  RRR, S1 and S2   ABD: +BS, NT, soft/ND	  EXT: No Edema B/L LE  NEURO: A+Ox3, no focal deficit  PSYCH: Mood Appropriate    LABS:                        10.3   6.42  )-----------( 277      ( 16 Jun 2021 06:08 )             32.5     06-16    138  |  103  |  14  ----------------------------<  112<H>  4.6   |  25  |  0.98    Ca    8.8      16 Jun 2021 06:08  Mg     2.0     06-16    TPro  7.4  /  Alb  3.2<L>  /  TBili  0.4  /  DBili  x   /  AST  21  /  ALT  21  /  AlkPhos  71  06-15    PT/INR - ( 16 Jun 2021 06:08 )   PT: 14.5 sec;   INR: 1.22      PTT - ( 14 Jun 2021 15:36 )  PTT:23.5 sec  Troponin T, Serum: 0.17 ng/mL (06-15-21 @ 15:33)  Troponin T, Serum: 0.17 ng/mL (06-15-21 @ 08:20)  Troponin T, Serum: 0.11 ng/mL (06-14-21 @ 19:38)  Troponin T, Serum: 0.14 ng/mL (06-14-21 @ 14:25)      06-15 @ 07:01  -  06-16 @ 07:00  --------------------------------------------------------  IN: 180 mL / OUT: 0 mL / NET: 180 mL

## 2021-06-16 NOTE — PROGRESS NOTE ADULT - ASSESSMENT
Patient is a 71 y/o F, with PMHx of HTN, HLD, GERD, who presented to [facility] ED on 06/14/2021 s/p syncopal episode that occurred earlier this morning while blow drying her hair s/p showering. Patient felt dizzy/lightheaded after taking a shower. Patient thought she was going to pass out so lowered herself down to the bathroom floor - states she "blacked out" unable to confirm duration. Patient reports similar intermittent episodes of lightheadedness this past month, without specific trigger. However, does state that she has not been feeling "right" in the humidity.  Patient admitted to cardiology/telemetry for syncope.    Patient is a 71 y/o F, with PMHx of HTN, HLD, GERD, who presented to [facility] ED on 06/14/2021 s/p possible a vasovagal syncopal episode that occurred earlier this morning, pt felt dizzy/lightheaded after taking shower, then syncopized while blow drying her hair s/p showering.  She is now admitted to cardiology/telemetry for syncope work. Pt found with an abnormal CCTA 6/15/21 revealing severe to subtotal stenosis  of mid RCA.  Ischemia of inferoseptal wall noted, EF normal.  ECHO 6/15/21 revealing EF 50-55%, basal and mid inferior septum and basal anteroseptal segments are hypokinetic.  Pt is scheduled for left heart cath today with Dr. Angel    ASA:  III   Mallampati: II    Appropriate for moderate sedation    Risks & benefits of procedure and alternative therapy have been explained to the patient including but not limited to: allergic reaction, bleeding w/possible need for blood transfusion, infection, renal and vascular compromise, limb damage, arrhythmia, stroke, vessel dissection/perforation, Myocardial infarction, emergent CABG. Informed consent obtained and in chart.

## 2021-06-16 NOTE — PROGRESS NOTE ADULT - PROBLEM SELECTOR PLAN 2
Troponin elevated to 0.14 on arrival, likely post fall  - EKG nonischemic   - Will continue to trend Juany  - Ischemic eval in am Mildly elevated troponin plateaued at 0.17, possible 2/2 to ischemic heart disease.  no need to trend further unless pt becomes symptomatic  -pt has remained CP free.  EKG non-ischemic  -See ECHO and CCTA results above  -Scheduled for [facility] today with Dr Angel, precath/consented.  Premedicated with Solu-cortef and Benadry prior to cath procedure,    -Loaded with Plavix 600mg on 6/15; c/w ASA 81 mg PO daily, Plavix 75 mg daily, Lipitor 40 mg daily and Metoprolol Succinate 25mg daily  -Consider Heparin gtt if pt becomes symptomatic  -A1c 6.4. Lipid Panel WNL. TSH 1.180

## 2021-06-16 NOTE — PROGRESS NOTE ADULT - PROBLEM SELECTOR PLAN 3
Hx of htn,  - Continue irbesartan-hydrochlorothiazide  150-12.5 m.5 mg BID   - Will hold antihypertensives now Stable, BP ranging 110's 140's  -Holding Irbesartan-hydrochlorothiazide 150-12.5 m.5 mg BID 2/2 to cath procedure  -c/w with Metoprolol Succ 25mg daily  -monitor BP

## 2021-06-17 ENCOUNTER — TRANSCRIPTION ENCOUNTER (OUTPATIENT)
Age: 71
End: 2021-06-17

## 2021-06-17 VITALS — SYSTOLIC BLOOD PRESSURE: 121 MMHG | HEART RATE: 75 BPM | DIASTOLIC BLOOD PRESSURE: 60 MMHG

## 2021-06-17 LAB
ANION GAP SERPL CALC-SCNC: 11 MMOL/L — SIGNIFICANT CHANGE UP (ref 5–17)
BUN SERPL-MCNC: 14 MG/DL — SIGNIFICANT CHANGE UP (ref 7–23)
CALCIUM SERPL-MCNC: 9.1 MG/DL — SIGNIFICANT CHANGE UP (ref 8.4–10.5)
CHLORIDE SERPL-SCNC: 104 MMOL/L — SIGNIFICANT CHANGE UP (ref 96–108)
CO2 SERPL-SCNC: 25 MMOL/L — SIGNIFICANT CHANGE UP (ref 22–31)
CREAT SERPL-MCNC: 0.82 MG/DL — SIGNIFICANT CHANGE UP (ref 0.5–1.3)
GLUCOSE SERPL-MCNC: 104 MG/DL — HIGH (ref 70–99)
HCT VFR BLD CALC: 32.2 % — LOW (ref 34.5–45)
HGB BLD-MCNC: 10.5 G/DL — LOW (ref 11.5–15.5)
MAGNESIUM SERPL-MCNC: 2.2 MG/DL — SIGNIFICANT CHANGE UP (ref 1.6–2.6)
MCHC RBC-ENTMCNC: 29.8 PG — SIGNIFICANT CHANGE UP (ref 27–34)
MCHC RBC-ENTMCNC: 32.6 GM/DL — SIGNIFICANT CHANGE UP (ref 32–36)
MCV RBC AUTO: 91.5 FL — SIGNIFICANT CHANGE UP (ref 80–100)
NRBC # BLD: 0 /100 WBCS — SIGNIFICANT CHANGE UP (ref 0–0)
PLATELET # BLD AUTO: 259 K/UL — SIGNIFICANT CHANGE UP (ref 150–400)
POTASSIUM SERPL-MCNC: 4.2 MMOL/L — SIGNIFICANT CHANGE UP (ref 3.5–5.3)
POTASSIUM SERPL-SCNC: 4.2 MMOL/L — SIGNIFICANT CHANGE UP (ref 3.5–5.3)
RBC # BLD: 3.52 M/UL — LOW (ref 3.8–5.2)
RBC # FLD: 11.6 % — SIGNIFICANT CHANGE UP (ref 10.3–14.5)
SODIUM SERPL-SCNC: 140 MMOL/L — SIGNIFICANT CHANGE UP (ref 135–145)
WBC # BLD: 6.78 K/UL — SIGNIFICANT CHANGE UP (ref 3.8–10.5)
WBC # FLD AUTO: 6.78 K/UL — SIGNIFICANT CHANGE UP (ref 3.8–10.5)

## 2021-06-17 PROCEDURE — 71045 X-RAY EXAM CHEST 1 VIEW: CPT

## 2021-06-17 PROCEDURE — 85379 FIBRIN DEGRADATION QUANT: CPT

## 2021-06-17 PROCEDURE — 86803 HEPATITIS C AB TEST: CPT

## 2021-06-17 PROCEDURE — 93306 TTE W/DOPPLER COMPLETE: CPT

## 2021-06-17 PROCEDURE — 85610 PROTHROMBIN TIME: CPT

## 2021-06-17 PROCEDURE — 84484 ASSAY OF TROPONIN QUANT: CPT

## 2021-06-17 PROCEDURE — 86769 SARS-COV-2 COVID-19 ANTIBODY: CPT

## 2021-06-17 PROCEDURE — C1769: CPT

## 2021-06-17 PROCEDURE — 80053 COMPREHEN METABOLIC PANEL: CPT

## 2021-06-17 PROCEDURE — 82550 ASSAY OF CK (CPK): CPT

## 2021-06-17 PROCEDURE — 80048 BASIC METABOLIC PNL TOTAL CA: CPT

## 2021-06-17 PROCEDURE — C1725: CPT

## 2021-06-17 PROCEDURE — 87086 URINE CULTURE/COLONY COUNT: CPT

## 2021-06-17 PROCEDURE — C1887: CPT

## 2021-06-17 PROCEDURE — C1894: CPT

## 2021-06-17 PROCEDURE — C1874: CPT

## 2021-06-17 PROCEDURE — 36415 COLL VENOUS BLD VENIPUNCTURE: CPT

## 2021-06-17 PROCEDURE — 82962 GLUCOSE BLOOD TEST: CPT

## 2021-06-17 PROCEDURE — 82553 CREATINE MB FRACTION: CPT

## 2021-06-17 PROCEDURE — 83036 HEMOGLOBIN GLYCOSYLATED A1C: CPT

## 2021-06-17 PROCEDURE — 93880 EXTRACRANIAL BILAT STUDY: CPT

## 2021-06-17 PROCEDURE — 83735 ASSAY OF MAGNESIUM: CPT

## 2021-06-17 PROCEDURE — 80061 LIPID PANEL: CPT

## 2021-06-17 PROCEDURE — 97162 PT EVAL MOD COMPLEX 30 MIN: CPT

## 2021-06-17 PROCEDURE — 75574 CT ANGIO HRT W/3D IMAGE: CPT

## 2021-06-17 PROCEDURE — 85730 THROMBOPLASTIN TIME PARTIAL: CPT

## 2021-06-17 PROCEDURE — 99232 SBSQ HOSP IP/OBS MODERATE 35: CPT

## 2021-06-17 PROCEDURE — 85025 COMPLETE CBC W/AUTO DIFF WBC: CPT

## 2021-06-17 PROCEDURE — 99285 EMERGENCY DEPT VISIT HI MDM: CPT

## 2021-06-17 PROCEDURE — 84443 ASSAY THYROID STIM HORMONE: CPT

## 2021-06-17 PROCEDURE — 70450 CT HEAD/BRAIN W/O DYE: CPT

## 2021-06-17 PROCEDURE — 81003 URINALYSIS AUTO W/O SCOPE: CPT

## 2021-06-17 PROCEDURE — 99239 HOSP IP/OBS DSCHRG MGMT >30: CPT

## 2021-06-17 PROCEDURE — 93005 ELECTROCARDIOGRAM TRACING: CPT

## 2021-06-17 PROCEDURE — 87635 SARS-COV-2 COVID-19 AMP PRB: CPT

## 2021-06-17 PROCEDURE — 85027 COMPLETE CBC AUTOMATED: CPT

## 2021-06-17 PROCEDURE — 96374 THER/PROPH/DIAG INJ IV PUSH: CPT

## 2021-06-17 RX ORDER — ATORVASTATIN CALCIUM 80 MG/1
1 TABLET, FILM COATED ORAL
Qty: 30 | Refills: 3
Start: 2021-06-17 | End: 2021-10-14

## 2021-06-17 RX ORDER — ASPIRIN/CALCIUM CARB/MAGNESIUM 324 MG
1 TABLET ORAL
Qty: 0 | Refills: 0 | DISCHARGE

## 2021-06-17 RX ORDER — ATORVASTATIN CALCIUM 80 MG/1
1 TABLET, FILM COATED ORAL
Qty: 0 | Refills: 0 | DISCHARGE

## 2021-06-17 RX ORDER — CLOPIDOGREL BISULFATE 75 MG/1
1 TABLET, FILM COATED ORAL
Qty: 30 | Refills: 11
Start: 2021-06-17 | End: 2022-06-11

## 2021-06-17 RX ORDER — ASPIRIN/CALCIUM CARB/MAGNESIUM 324 MG
1 TABLET ORAL
Qty: 30 | Refills: 11
Start: 2021-06-17 | End: 2022-06-11

## 2021-06-17 RX ADMIN — HEPARIN SODIUM 5000 UNIT(S): 5000 INJECTION INTRAVENOUS; SUBCUTANEOUS at 06:11

## 2021-06-17 RX ADMIN — CLOPIDOGREL BISULFATE 75 MILLIGRAM(S): 75 TABLET, FILM COATED ORAL at 11:38

## 2021-06-17 RX ADMIN — Medication 81 MILLIGRAM(S): at 11:38

## 2021-06-17 RX ADMIN — Medication 25 MILLIGRAM(S): at 11:38

## 2021-06-17 NOTE — DISCHARGE NOTE NURSING/CASE MANAGEMENT/SOCIAL WORK - PATIENT PORTAL LINK FT
You can access the FollowMyHealth Patient Portal offered by [facility] by registering at the following website: [email or web address]. By joining [employer]’s FollowMyHealth portal, you will also be able to view your health information using other applications (apps) compatible with our system.

## 2021-06-17 NOTE — DISCHARGE NOTE PROVIDER - HOSPITAL COURSE
71 y/o F, with PMHx of HTN, HLD, GERD, who presented to [facility] ED on 06/14/2021 s/p syncopal episode that occurred earlier this morning while blow drying her hair s/p showering. Patient felt dizzy/lightheaded after taking a shower. Patient thought she was going to pass out so lowered herself down to the bathroom floor - states she "blacked out" unable to confirm duration. Patient reports similar intermittent episodes of lightheadedness this past month, without specific trigger. However, does state that she has not been feeling "right" in the humidity.  Patient also endorses a mild headache at the time of lightheadedness. In the ED, VS - T: 97.8 HR: 78 bpm BP: 111/57 mmHg  RR: 18 beats/min spO2: 99% on RA. Labs significant for: K: 3.8, D-Dimer: 232, Glucose: 154, Troponin T: 0.14, COVID: negative, CT head: no acute hemorrhage or ischemia, EKG: NSR @ 79 bpm, no ST-T wave changes. Pt found with an abnormal CCTA 6/15/21 revealing severe to subtotal stenosis  of mid RCA.  Ischemia of inferoseptal wall noted, EF normal.  ECHO 6/15/21 revealing EF 50-55%, basal and mid inferior septum and basal anteroseptal segments are hypokinetic.  Patient subsequently underwent cardiac catheterization 6/16/21 with successful FARIBA mRCA. Patient currently asymptomatic, VSS, right radial access site soft, NT, no hematoma, radial pulse 2+. Labs/telemetry reviewed. Patient deemed stable for discharge as per Dr. Joaquin and to follow-up with       in 1-2 weeks. All needed prescriptions have been e-prescribed to patients preferred outpatient pharmacy.       Cardiac Rehab (STEMI/NSTEMI/ACS/Unstable Angina/CHF/Post PCI):            *Education on benefits of Cardiac Rehab provided to patient: Yes/No         *Referral and Prescription Given for Cardiac Rehab : Yes/No.  If No, Why Not?         *Pt given list of locations & instructed to contact their insurance company to review list of participating providers    69 y/o F, with PMHx of HTN, HLD, GERD, who presented to [facility] ED on 06/14/2021 s/p syncopal episode that occurred earlier this morning while blow drying her hair s/p showering. Patient felt dizzy/lightheaded after taking a shower. Patient thought she was going to pass out so lowered herself down to the bathroom floor - states she "blacked out" unable to confirm duration. Patient reports similar intermittent episodes of lightheadedness this past month, without specific trigger. However, does state that she has not been feeling "right" in the humidity.  Patient also endorses a mild headache at the time of lightheadedness. In the ED, VS - T: 97.8 HR: 78 bpm BP: 111/57 mmHg  RR: 18 beats/min spO2: 99% on RA. Labs significant for: K: 3.8, D-Dimer: 232, Glucose: 154, Troponin T: 0.14, COVID: negative, CT head: no acute hemorrhage or ischemia, EKG: NSR @ 79 bpm, no ST-T wave changes. Pt found with an abnormal CCTA 6/15/21 revealing severe to subtotal stenosis  of mid RCA.  Ischemia of inferoseptal wall noted, EF normal.  ECHO 6/15/21 revealing EF 50-55%, basal and mid inferior septum and basal anteroseptal segments are hypokinetic.  Patient subsequently underwent cardiac catheterization 6/16/21 with successful FARIBA mRCA. Carotid US: _______      Patient currently asymptomatic, VSS, right radial access site soft, NT, no hematoma, radial pulse 2+. Labs/telemetry reviewed. Patient deemed stable for discharge as per Dr. Joaquin on ASA 81mg QD, Plavix 75mg QD, Atorvastatin 40mg QD and home Irbesartan-HCTZ 150-12.5mg (1/2 tab BID) with plan to follow-up with  Dr. Lopez in 1-2 weeks. All needed prescriptions have been e-prescribed to patients preferred outpatient pharmacy.       Cardiac Rehab (STEMI/NSTEMI/ACS/Unstable Angina/CHF/Post PCI):            *Education on benefits of Cardiac Rehab provided to patient: Yes/No         *Referral and Prescription Given for Cardiac Rehab : Yes/No.  If No, Why Not?         *Pt given list of locations & instructed to contact their insurance company to review list of participating providers    69 y/o F, with PMHx of HTN, HLD, GERD, who presented to [facility] ED on 06/14/2021 s/p syncopal episode that occurred earlier this morning while blow drying her hair s/p showering. Patient felt dizzy/lightheaded after taking a shower. Patient thought she was going to pass out so lowered herself down to the bathroom floor - states she "blacked out" unable to confirm duration. Patient reports similar intermittent episodes of lightheadedness this past month, without specific trigger. However, does state that she has not been feeling "right" in the humidity.  Patient also endorses a mild headache at the time of lightheadedness. In the ED, VS - T: 97.8 HR: 78 bpm BP: 111/57 mmHg  RR: 18 beats/min spO2: 99% on RA. Labs significant for: K: 3.8, D-Dimer: 232, Glucose: 154, Troponin T: 0.14, COVID: negative, CT head: no acute hemorrhage or ischemia, EKG: NSR @ 79 bpm, no ST-T wave changes. Pt found with an abnormal CCTA 6/15/21 revealing severe to subtotal stenosis  of mid RCA.  Ischemia of inferoseptal wall noted, EF normal.  ECHO 6/15/21 revealing EF 50-55%, basal and mid inferior septum and basal anteroseptal segments are hypokinetic.  Patient subsequently underwent cardiac catheterization 6/16/21 with successful FARIBA mRCA. Carotid US: moderate-severe proximal left ICA 50-69%, moderate plaque in R bifurcation and R ICA.    Patient currently asymptomatic, VSS, right radial access site soft, NT, no hematoma, radial pulse 2+. Labs/telemetry reviewed. Patient deemed stable for discharge as per Dr. Joaquin on ASA 81mg QD, Plavix 75mg QD, Atorvastatin 40mg QD and home Irbesartan-HCTZ 150-12.5mg (1/2 tab BID) with plan to follow-up with  Dr. Lopez in 1-2 weeks. All needed prescriptions have been e-prescribed to patients preferred outpatient pharmacy.       Cardiac Rehab (STEMI/NSTEMI/ACS/Unstable Angina/CHF/Post PCI):            *Education on benefits of Cardiac Rehab provided to patient: Yes/No         *Referral and Prescription Given for Cardiac Rehab : Yes/No.  If No, Why Not?         *Pt given list of locations & instructed to contact their insurance company to review list of participating providers

## 2021-06-17 NOTE — PROGRESS NOTE ADULT - SUBJECTIVE AND OBJECTIVE BOX
INTERVAL HPI/OVERNIGHT EVENTS:  Interim reviewed; S/P stent RCA;  Feels well; Will get discharged today  Will review Carotid studies      MEDICATIONS  (STANDING):  aspirin enteric coated 81 milliGRAM(s) Oral daily  atorvastatin 40 milliGRAM(s) Oral at bedtime  clopidogrel Tablet 75 milliGRAM(s) Oral daily  diphenhydrAMINE   Injectable 50 milliGRAM(s) IV Push once  heparin   Injectable 5000 Unit(s) SubCutaneous every 8 hours  metoprolol succinate ER 25 milliGRAM(s) Oral daily  senna 1 Tablet(s) Oral at bedtime  sodium chloride 0.9%. 500 milliLiter(s) (75 mL/Hr) IV Continuous <Continuous>    MEDICATIONS  (PRN):      Allergies    No Known Drug Allergies  shellfish (Rash)    Intolerances        Vital Signs Last 24 Hrs  T(C): 36.3 (17 Jun 2021 06:19), Max: 37.3 (16 Jun 2021 18:01)  T(F): 97.3 (17 Jun 2021 06:19), Max: 99.2 (16 Jun 2021 18:01)  HR: 60 (17 Jun 2021 05:46) (60 - 76)  BP: 107/59 (17 Jun 2021 05:46) (102/50 - 153/67)  BP(mean): 50 (16 Jun 2021 09:33) (50 - 50)  RR: 18 (17 Jun 2021 05:46) (18 - 18)  SpO2: 95% (17 Jun 2021 05:46) (88% - 98%)          Constitutional:    Eyes: PABLITO    ENMT: Negative    Neck: Supple    Back:  no tenderness     Respiratory:  clear    Cardiovascular: S1 S2    Gastrointestinal: soft    Genitourinary:    Extremities:  no edema    Vascular:    Neurological:    Skin:    Lymph Nodes:            LABS:                        10.5   6.78  )-----------( 259      ( 17 Jun 2021 06:46 )             32.2     06-17    140  |  104  |  14  ----------------------------<  104<H>  4.2   |  25  |  0.82    Ca    9.1      17 Jun 2021 06:46  Mg     2.2     06-17      PT/INR - ( 16 Jun 2021 06:08 )   PT: 14.5 sec;   INR: 1.22                RADIOLOGY & ADDITIONAL TESTS:

## 2021-06-17 NOTE — DISCHARGE NOTE PROVIDER - NSDCCPCAREPLAN_GEN_ALL_CORE_FT
PRINCIPAL DISCHARGE DIAGNOSIS  Diagnosis: CAD (coronary artery disease)  Assessment and Plan of Treatment: You were noted to have a blockage in one of the coronary arteries that supply blood to your heart muscle.  --You underwent percutaneous coronary intervention with drug eluding stent placement in your mid right coronary artery.  --Continue Aspirin 81mg by mouth daily and Plavix 75mg by mouth daily.  --DO NOT STOP TAKING ASPIRIN OR PLAVIX UNLESS INSTRUCTED BY YOUR CARDIOLOGIST TO PREVENT STENT CLOSURE/HEART ATTACK.  ***We have provided you with a prescription for cardiac rehab which is medically supervised exercise program for your heart and has been shown to improve the quantity and quality of life of people with heart disease like yours.   ***You should attend cardiac rehab 3 times per week for 12 weeks. We have provided you with a list of nearby facilities.   ***Please call your insurance carrier to determine which of these facilities are covered under your plan.   ***Please bring this prescription with you to your follow up appointment with your cardiologist who can then further assist you to enroll into a cardiac rehab program.      SECONDARY DISCHARGE DIAGNOSES  Diagnosis: Hypertension  Assessment and Plan of Treatment: Continue home regimen: Irbesartan HCTZ 150/12.5mg by mouth daily    Diagnosis: Hyperlipemia  Assessment and Plan of Treatment: Your cholesterol medication has been adjusted, please take increased dose of Atorvastatin 40mg by mouth at bedtime

## 2021-06-17 NOTE — DISCHARGE NOTE PROVIDER - NSDCMRMEDTOKEN_GEN_ALL_CORE_FT
Aspirin Enteric Coated 81 mg oral delayed release tablet: 1 tab(s) orally once a day  irbesartan-hydrochlorothiazide 150mg-12.5mg oral tablet: 0.5 tab(s) orally 2 times a day  Lipitor 10 mg oral tablet: 1 tab(s) orally once a day  Osteo Bi-Flex 250 mg-200 mg oral tablet: 1 tab(s) orally once a day  senna 8.6 mg oral tablet: 1 tab(s) orally once a day (at bedtime)   Aspirin Enteric Coated 81 mg oral delayed release tablet: 1 tab(s) orally once a day  atorvastatin 40 mg oral tablet: 1 tab(s) orally once a day (at bedtime)  cardiac rehabilitation: cardiac rehabilitation 3x/week for 12 weeks  clopidogrel 75 mg oral tablet: 1 tab(s) orally once a day  irbesartan-hydrochlorothiazide 150mg-12.5mg oral tablet: 0.5 tab(s) orally 2 times a day  Osteo Bi-Flex 250 mg-200 mg oral tablet: 1 tab(s) orally once a day  senna 8.6 mg oral tablet: 1 tab(s) orally once a day (at bedtime)

## 2021-06-17 NOTE — PROGRESS NOTE ADULT - SUBJECTIVE AND OBJECTIVE BOX
Interventional Cardiology Radial band Removal Note    Patient seen at bedside resting comfortably, denies current acute complaints.    Right TR band in place, no hematoma, no bleed  Radial pulse: 2+    Hemostasis achieved with manual release of TR band    No  Vasovagal reaction.    Meds given: None    Right Radial access site soft, no hematoma, no bleed  Radial pulse: 2+    A/P:  s/p PTCA/Stent   -	continue to monitor  -	OOB as tolerated  -	Post Procedure Instructions given  -           Call [phone] if any access site issues.

## 2021-06-17 NOTE — DISCHARGE NOTE PROVIDER - NSDCFUADDINST_GEN_ALL_CORE_FT
***You underwent a coronary angiogram and should wait 3 days before returning to ordinary activities.   ***The catheter from your wrist was removed and you should remove the dressing in 24 hours. ***You may shower once the dressing is removed, but avoid baths, hot tubs, or swimming for 5 days to prevent infection.   ***If you notice bleeding from the site, hardening and pain at the site, drainage or redness from the site, coolness/paleness of the extremity, swelling, or fever, please call [phone].

## 2021-06-17 NOTE — PROGRESS NOTE ADULT - TIME BILLING
S/P stent;  Check carotid studies  Discharge today likely
Patient seen and examined  CT angio noted;  Will get cath tomorrow;  Discussed with patient and sister;

## 2021-06-17 NOTE — PROGRESS NOTE ADULT - PROVIDER SPECIALTY LIST ADULT
Intervent Cardiology
Internal Medicine
Internal Medicine
Cardiology

## 2021-06-22 DIAGNOSIS — E78.5 HYPERLIPIDEMIA, UNSPECIFIED: ICD-10-CM

## 2021-06-22 DIAGNOSIS — Z79.82 LONG TERM (CURRENT) USE OF ASPIRIN: ICD-10-CM

## 2021-06-22 DIAGNOSIS — I25.10 ATHEROSCLEROTIC HEART DISEASE OF NATIVE CORONARY ARTERY WITHOUT ANGINA PECTORIS: ICD-10-CM

## 2021-06-22 DIAGNOSIS — Z72.0 TOBACCO USE: ICD-10-CM

## 2021-06-22 DIAGNOSIS — R55 SYNCOPE AND COLLAPSE: ICD-10-CM

## 2021-06-22 DIAGNOSIS — R77.8 OTHER SPECIFIED ABNORMALITIES OF PLASMA PROTEINS: ICD-10-CM

## 2021-06-22 DIAGNOSIS — I10 ESSENTIAL (PRIMARY) HYPERTENSION: ICD-10-CM

## 2021-06-22 DIAGNOSIS — K21.9 GASTRO-ESOPHAGEAL REFLUX DISEASE WITHOUT ESOPHAGITIS: ICD-10-CM

## 2021-06-22 DIAGNOSIS — I65.23 OCCLUSION AND STENOSIS OF BILATERAL CAROTID ARTERIES: ICD-10-CM

## 2021-06-22 DIAGNOSIS — Z91.013 ALLERGY TO SEAFOOD: ICD-10-CM

## 2021-07-01 ENCOUNTER — NON-APPOINTMENT (OUTPATIENT)
Age: 71
End: 2021-07-01

## 2021-07-01 ENCOUNTER — APPOINTMENT (OUTPATIENT)
Dept: HEART AND VASCULAR | Facility: CLINIC | Age: 71
End: 2021-07-01
Payer: MEDICARE

## 2021-07-01 VITALS
SYSTOLIC BLOOD PRESSURE: 118 MMHG | BODY MASS INDEX: 28.89 KG/M2 | TEMPERATURE: 98.7 F | HEIGHT: 61 IN | OXYGEN SATURATION: 97 % | WEIGHT: 153 LBS | HEART RATE: 85 BPM | DIASTOLIC BLOOD PRESSURE: 62 MMHG

## 2021-07-01 DIAGNOSIS — I10 ESSENTIAL (PRIMARY) HYPERTENSION: ICD-10-CM

## 2021-07-01 DIAGNOSIS — Z80.9 FAMILY HISTORY OF MALIGNANT NEOPLASM, UNSPECIFIED: ICD-10-CM

## 2021-07-01 DIAGNOSIS — Z82.49 FAMILY HISTORY OF ISCHEMIC HEART DISEASE AND OTHER DISEASES OF THE CIRCULATORY SYSTEM: ICD-10-CM

## 2021-07-01 DIAGNOSIS — Z82.3 FAMILY HISTORY OF STROKE: ICD-10-CM

## 2021-07-01 PROCEDURE — 99204 OFFICE O/P NEW MOD 45 MIN: CPT

## 2021-07-01 PROCEDURE — 93000 ELECTROCARDIOGRAM COMPLETE: CPT

## 2021-07-01 RX ORDER — ATORVASTATIN CALCIUM 10 MG/1
10 TABLET, FILM COATED ORAL
Qty: 90 | Refills: 3 | Status: DISCONTINUED | COMMUNITY
Start: 2018-01-08 | End: 2021-07-01

## 2021-07-01 NOTE — REASON FOR VISIT
[Coronary Artery Disease] : coronary artery disease [Family Member] : family member [FreeTextEntry1] : New patient to me. Referred for follow-up care after recent hospitalization at [facility] for ACS and PCI.\par Former smoker, with HTN, pre-diabetes, dyslipidemia and + FHx of vascular disease. No prior hx of CHD.\par Had sudden onset of severe lightheadedness about 2 weeks ago, while standing in her kitchen. No chest pain or palpitations. Admitted to [facility] and had extensive w/u. Cardiac enzymes elevated, which led to CT cor angio demonstrating high grade lesion in mid-RCA, with mild disease elsewhere (calcium score 187/ 77th percentile). LV fxn overall normal with inferior hypokinesis on echo. Underwent successful stenting of RCA and discharged on intensified program of secondary prevention.\par Has felt a bit tired since being home.\par No chest pain, palpitations, lightheadedness. She is generally sedentary (walks a few blocks) in part because of orthopedic limitations (L knee/ hip pain). \par . Lives alone, but has sister in same building. Works as  in local [employer].

## 2021-07-01 NOTE — PHYSICAL EXAM
[No Acute Distress] : no acute distress [Normal Conjunctiva] : normal conjunctiva [Normal Venous Pressure] : normal venous pressure [Normal] : soft, non-tender, no masses/organomegaly, normal bowel sounds [No Edema] : no edema

## 2021-07-16 ENCOUNTER — RX RENEWAL (OUTPATIENT)
Age: 71
End: 2021-07-16

## 2021-08-12 ENCOUNTER — NON-APPOINTMENT (OUTPATIENT)
Age: 71
End: 2021-08-12

## 2021-08-12 ENCOUNTER — APPOINTMENT (OUTPATIENT)
Dept: HEART AND VASCULAR | Facility: CLINIC | Age: 71
End: 2021-08-12
Payer: MEDICARE

## 2021-08-12 ENCOUNTER — LABORATORY RESULT (OUTPATIENT)
Age: 71
End: 2021-08-12

## 2021-08-12 VITALS
HEIGHT: 61 IN | DIASTOLIC BLOOD PRESSURE: 62 MMHG | BODY MASS INDEX: 28.89 KG/M2 | OXYGEN SATURATION: 98 % | HEART RATE: 66 BPM | TEMPERATURE: 97.8 F | WEIGHT: 153 LBS | SYSTOLIC BLOOD PRESSURE: 125 MMHG

## 2021-08-12 PROCEDURE — 99213 OFFICE O/P EST LOW 20 MIN: CPT

## 2021-08-12 RX ORDER — DOCUSATE SODIUM 100 MG/1
TABLET ORAL DAILY
Refills: 0 | Status: DISCONTINUED | COMMUNITY
End: 2021-08-12

## 2021-08-12 NOTE — REASON FOR VISIT
[FreeTextEntry1] : Feels generally well.\par Less fatigued, and staring to exercise a bit. Looking forward to water aerobics class at [employer].\par No chest pain, palpitations, PRICE, lightheadedness.\par Some concern about bruising on DAPT.\par Patient reports taking her listed medications as directed.\par

## 2021-08-17 LAB
APPEARANCE: ABNORMAL
BILIRUBIN URINE: NEGATIVE
BLOOD URINE: NEGATIVE
CHOLEST SERPL-MCNC: 128 MG/DL
COLOR: YELLOW
GLUCOSE QUALITATIVE U: NEGATIVE
HDLC SERPL-MCNC: 37 MG/DL
KETONES URINE: NEGATIVE
LDLC SERPL CALC-MCNC: 60 MG/DL
LEUKOCYTE ESTERASE URINE: NEGATIVE
NITRITE URINE: NEGATIVE
NONHDLC SERPL-MCNC: 90 MG/DL
PH URINE: 5.5
PROTEIN URINE: NEGATIVE
SPECIFIC GRAVITY URINE: 1.02
TRIGL SERPL-MCNC: 152 MG/DL
UROBILINOGEN URINE: NORMAL

## 2021-10-11 ENCOUNTER — RX RENEWAL (OUTPATIENT)
Age: 71
End: 2021-10-11

## 2021-10-13 ENCOUNTER — RX RENEWAL (OUTPATIENT)
Age: 71
End: 2021-10-13

## 2021-10-21 ENCOUNTER — APPOINTMENT (OUTPATIENT)
Dept: HEART AND VASCULAR | Facility: CLINIC | Age: 71
End: 2021-10-21
Payer: MEDICARE

## 2021-10-21 ENCOUNTER — APPOINTMENT (OUTPATIENT)
Dept: HEART AND VASCULAR | Facility: CLINIC | Age: 71
End: 2021-10-21

## 2021-10-21 VITALS
WEIGHT: 151.99 LBS | HEART RATE: 85 BPM | TEMPERATURE: 96.6 F | OXYGEN SATURATION: 98 % | DIASTOLIC BLOOD PRESSURE: 58 MMHG | SYSTOLIC BLOOD PRESSURE: 103 MMHG | HEIGHT: 61 IN | BODY MASS INDEX: 28.7 KG/M2

## 2021-10-21 PROCEDURE — 99213 OFFICE O/P EST LOW 20 MIN: CPT

## 2021-10-21 PROCEDURE — 93000 ELECTROCARDIOGRAM COMPLETE: CPT

## 2021-10-21 RX ORDER — DOCUSATE SODIUM 100 MG/1
CAPSULE ORAL
Refills: 0 | Status: ACTIVE | COMMUNITY

## 2021-10-21 RX ORDER — ATORVASTATIN CALCIUM 40 MG/1
40 TABLET, FILM COATED ORAL DAILY
Refills: 0 | Status: DISCONTINUED | COMMUNITY
End: 2021-10-21

## 2021-10-21 RX ORDER — ASPIRIN 81 MG
81 TABLET, DELAYED RELEASE (ENTERIC COATED) ORAL
Refills: 0 | Status: ACTIVE | COMMUNITY

## 2021-10-21 RX ORDER — ASCORBIC ACID 500 MG
500-400 TABLET,CHEWABLE ORAL
Refills: 0 | Status: ACTIVE | COMMUNITY

## 2021-11-10 ENCOUNTER — RX RENEWAL (OUTPATIENT)
Age: 71
End: 2021-11-10

## 2021-11-18 ENCOUNTER — APPOINTMENT (OUTPATIENT)
Dept: HEART AND VASCULAR | Facility: CLINIC | Age: 71
End: 2021-11-18

## 2022-01-08 ENCOUNTER — RX RENEWAL (OUTPATIENT)
Age: 72
End: 2022-01-08

## 2022-02-03 ENCOUNTER — APPOINTMENT (OUTPATIENT)
Dept: HEART AND VASCULAR | Facility: CLINIC | Age: 72
End: 2022-02-03
Payer: MEDICARE

## 2022-02-03 VITALS
WEIGHT: 153 LBS | BODY MASS INDEX: 28.89 KG/M2 | OXYGEN SATURATION: 97 % | HEART RATE: 83 BPM | SYSTOLIC BLOOD PRESSURE: 113 MMHG | TEMPERATURE: 97.3 F | DIASTOLIC BLOOD PRESSURE: 68 MMHG | HEIGHT: 61 IN

## 2022-02-03 DIAGNOSIS — R55 SYNCOPE AND COLLAPSE: ICD-10-CM

## 2022-02-03 PROCEDURE — 99212 OFFICE O/P EST SF 10 MIN: CPT

## 2022-02-03 NOTE — HISTORY OF PRESENT ILLNESS
[FreeTextEntry1] : Feels well.\par No ischemic symptoms. Walking regularly and doing housework without limitation.\par No other recent medical problems.\par On lower dose (20 mg) of atorvastatin with resolution of muscle aches\par Patient reports taking her listed medications as directed.\par

## 2022-02-07 ENCOUNTER — RX RENEWAL (OUTPATIENT)
Age: 72
End: 2022-02-07

## 2022-03-15 ENCOUNTER — APPOINTMENT (OUTPATIENT)
Dept: INTERNAL MEDICINE | Facility: CLINIC | Age: 72
End: 2022-03-15
Payer: MEDICARE

## 2022-03-15 VITALS
WEIGHT: 153 LBS | HEART RATE: 82 BPM | SYSTOLIC BLOOD PRESSURE: 117 MMHG | OXYGEN SATURATION: 98 % | TEMPERATURE: 97.9 F | DIASTOLIC BLOOD PRESSURE: 68 MMHG | HEIGHT: 61 IN | BODY MASS INDEX: 28.89 KG/M2 | RESPIRATION RATE: 16 BRPM

## 2022-03-15 DIAGNOSIS — Z92.29 PERSONAL HISTORY OF OTHER DRUG THERAPY: ICD-10-CM

## 2022-03-15 PROCEDURE — 36415 COLL VENOUS BLD VENIPUNCTURE: CPT

## 2022-03-15 PROCEDURE — G0439: CPT

## 2022-03-15 NOTE — HEALTH RISK ASSESSMENT
[Good] : ~his/her~  mood as  good [Current] : Current [20 or more] : 20 or more [No] : No [No falls in past year] : Patient reported no falls in the past year [0] : 2) Feeling down, depressed, or hopeless: Not at all (0) [Patient declined mammogram] : Patient declined mammogram [Patient declined PAP Smear] : Patient declined PAP Smear [Patient declined colonoscopy] : Patient declined colonoscopy [HIV test declined] : HIV test declined [Hepatitis C test declined] : Hepatitis C test declined [[id number]] : 0 [BoneDensityComments] : will order

## 2022-03-15 NOTE — HISTORY OF PRESENT ILLNESS
[FreeTextEntry1] : Chief complaint cardiac status stable  [de-identified] : Uses Voltaren gel for knees

## 2022-03-15 NOTE — ASSESSMENT
[FreeTextEntry1] : Stable examination; \par All labs;\par Follow up with Dr. Irvin\par Travel medicine

## 2022-03-21 LAB
25(OH)D3 SERPL-MCNC: 22.8 NG/ML
ALBUMIN SERPL ELPH-MCNC: 4.1 G/DL
ALP BLD-CCNC: 93 U/L
ALT SERPL-CCNC: 16 U/L
ANION GAP SERPL CALC-SCNC: 12 MMOL/L
APPEARANCE: CLEAR
AST SERPL-CCNC: 19 U/L
BACTERIA: NEGATIVE
BASOPHILS # BLD AUTO: 0.03 K/UL
BASOPHILS NFR BLD AUTO: 0.4 %
BILIRUB SERPL-MCNC: 0.3 MG/DL
BILIRUBIN URINE: NEGATIVE
BLOOD URINE: NEGATIVE
BUN SERPL-MCNC: 17 MG/DL
CALCIUM SERPL-MCNC: 9.2 MG/DL
CHLORIDE SERPL-SCNC: 101 MMOL/L
CHOLEST SERPL-MCNC: 139 MG/DL
CO2 SERPL-SCNC: 25 MMOL/L
COLOR: NORMAL
CREAT SERPL-MCNC: 0.94 MG/DL
EGFR: 64 ML/MIN/1.73M2
EOSINOPHIL # BLD AUTO: 0.09 K/UL
EOSINOPHIL NFR BLD AUTO: 1.3 %
ESTIMATED AVERAGE GLUCOSE: 131 MG/DL
GLUCOSE QUALITATIVE U: NEGATIVE
GLUCOSE SERPL-MCNC: 80 MG/DL
HBA1C MFR BLD HPLC: 6.2 %
HCT VFR BLD CALC: 42.5 %
HDLC SERPL-MCNC: 44 MG/DL
HGB BLD-MCNC: 13.6 G/DL
HYALINE CASTS: 1 /LPF
IMM GRANULOCYTES NFR BLD AUTO: 0.3 %
KETONES URINE: NEGATIVE
LDLC SERPL CALC-MCNC: 70 MG/DL
LEUKOCYTE ESTERASE URINE: NEGATIVE
LYMPHOCYTES # BLD AUTO: 1.53 K/UL
LYMPHOCYTES NFR BLD AUTO: 21.8 %
MAN DIFF?: NORMAL
MCHC RBC-ENTMCNC: 30.8 PG
MCHC RBC-ENTMCNC: 32 GM/DL
MCV RBC AUTO: 96.2 FL
MICROSCOPIC-UA: NORMAL
MONOCYTES # BLD AUTO: 0.4 K/UL
MONOCYTES NFR BLD AUTO: 5.7 %
NEUTROPHILS # BLD AUTO: 4.95 K/UL
NEUTROPHILS NFR BLD AUTO: 70.5 %
NITRITE URINE: NEGATIVE
NONHDLC SERPL-MCNC: 95 MG/DL
PH URINE: 5
PLATELET # BLD AUTO: 233 K/UL
POTASSIUM SERPL-SCNC: 4.2 MMOL/L
PROT SERPL-MCNC: 6.7 G/DL
PROTEIN URINE: NEGATIVE
RBC # BLD: 4.42 M/UL
RBC # FLD: 12.6 %
RED BLOOD CELLS URINE: 0 /HPF
SODIUM SERPL-SCNC: 139 MMOL/L
SPECIFIC GRAVITY URINE: 1.01
SQUAMOUS EPITHELIAL CELLS: 1 /HPF
T4 FREE SERPL-MCNC: 1.2 NG/DL
TRIGL SERPL-MCNC: 123 MG/DL
TSH SERPL-ACNC: 3.18 UIU/ML
UROBILINOGEN URINE: NORMAL
WBC # FLD AUTO: 7.02 K/UL
WHITE BLOOD CELLS URINE: 1 /HPF

## 2022-04-08 ENCOUNTER — RX RENEWAL (OUTPATIENT)
Age: 72
End: 2022-04-08

## 2022-05-07 ENCOUNTER — RX RENEWAL (OUTPATIENT)
Age: 72
End: 2022-05-07

## 2022-05-13 ENCOUNTER — APPOINTMENT (OUTPATIENT)
Dept: HEART AND VASCULAR | Facility: CLINIC | Age: 72
End: 2022-05-13

## 2022-05-13 VITALS
TEMPERATURE: 97.2 F | HEART RATE: 84 BPM | DIASTOLIC BLOOD PRESSURE: 59 MMHG | WEIGHT: 153 LBS | OXYGEN SATURATION: 99 % | BODY MASS INDEX: 28.89 KG/M2 | SYSTOLIC BLOOD PRESSURE: 119 MMHG | HEIGHT: 61 IN

## 2022-05-13 PROCEDURE — 99214 OFFICE O/P EST MOD 30 MIN: CPT

## 2022-05-13 PROCEDURE — 93000 ELECTROCARDIOGRAM COMPLETE: CPT

## 2022-05-20 ENCOUNTER — APPOINTMENT (OUTPATIENT)
Dept: HEART AND VASCULAR | Facility: CLINIC | Age: 72
End: 2022-05-20
Payer: MEDICARE

## 2022-05-20 PROCEDURE — 93306 TTE W/DOPPLER COMPLETE: CPT

## 2022-05-25 ENCOUNTER — RESULT REVIEW (OUTPATIENT)
Age: 72
End: 2022-05-25

## 2022-05-25 ENCOUNTER — OUTPATIENT (OUTPATIENT)
Dept: OUTPATIENT SERVICES | Facility: HOSPITAL | Age: 72
LOS: 1 days | End: 2022-05-25
Payer: MEDICARE

## 2022-05-25 DIAGNOSIS — I25.9 CHRONIC ISCHEMIC HEART DISEASE, UNSPECIFIED: ICD-10-CM

## 2022-05-25 DIAGNOSIS — Z41.9 ENCOUNTER FOR PROCEDURE FOR PURPOSES OTHER THAN REMEDYING HEALTH STATE, UNSPECIFIED: Chronic | ICD-10-CM

## 2022-05-25 DIAGNOSIS — Z98.890 OTHER SPECIFIED POSTPROCEDURAL STATES: Chronic | ICD-10-CM

## 2022-05-25 PROCEDURE — 93016 CV STRESS TEST SUPVJ ONLY: CPT

## 2022-05-25 PROCEDURE — 78452 HT MUSCLE IMAGE SPECT MULT: CPT | Mod: 26,MH

## 2022-05-25 PROCEDURE — A9500: CPT

## 2022-05-25 PROCEDURE — 93018 CV STRESS TEST I&R ONLY: CPT

## 2022-05-25 PROCEDURE — 78452 HT MUSCLE IMAGE SPECT MULT: CPT

## 2022-05-25 PROCEDURE — 93017 CV STRESS TEST TRACING ONLY: CPT

## 2022-06-02 ENCOUNTER — APPOINTMENT (OUTPATIENT)
Dept: HEART AND VASCULAR | Facility: CLINIC | Age: 72
End: 2022-06-02
Payer: MEDICARE

## 2022-06-02 VITALS
BODY MASS INDEX: 29.07 KG/M2 | DIASTOLIC BLOOD PRESSURE: 59 MMHG | WEIGHT: 154 LBS | OXYGEN SATURATION: 96 % | SYSTOLIC BLOOD PRESSURE: 105 MMHG | TEMPERATURE: 97.2 F | HEART RATE: 81 BPM | HEIGHT: 61 IN

## 2022-06-02 PROCEDURE — 99213 OFFICE O/P EST LOW 20 MIN: CPT

## 2022-06-02 NOTE — HISTORY OF PRESENT ILLNESS
[FreeTextEntry1] : Follow up visit after recent non-invasive cardiac testing.\par Stress MPI negative for ischemia; echo unremarkable.\par Patient no longer has pain, but still feels unwell, with fatigue and weakness. Not progressive; has been present for months. No fever, chills, GI symptoms, weight change. No shortness of breath, cough or wheeze. No antecedent acute illness. No new joint or muscle complaints. No change in bowel habits. No sleep disturbance.\par She is anxious about upcoming planned trip to [facility].

## 2022-06-03 ENCOUNTER — NON-APPOINTMENT (OUTPATIENT)
Age: 72
End: 2022-06-03

## 2022-06-03 LAB
ALBUMIN SERPL ELPH-MCNC: 4.2 G/DL
ALP BLD-CCNC: 86 U/L
ALT SERPL-CCNC: 13 U/L
ANION GAP SERPL CALC-SCNC: 15 MMOL/L
AST SERPL-CCNC: 18 U/L
BASOPHILS # BLD AUTO: 0.03 K/UL
BASOPHILS NFR BLD AUTO: 0.5 %
BILIRUB SERPL-MCNC: 0.3 MG/DL
BUN SERPL-MCNC: 13 MG/DL
CALCIUM SERPL-MCNC: 9.4 MG/DL
CHLORIDE SERPL-SCNC: 100 MMOL/L
CO2 SERPL-SCNC: 25 MMOL/L
CREAT SERPL-MCNC: 0.92 MG/DL
CRP SERPL HS-MCNC: 0.71 MG/L
EGFR: 66 ML/MIN/1.73M2
EOSINOPHIL # BLD AUTO: 0.08 K/UL
EOSINOPHIL NFR BLD AUTO: 1.3 %
ESTIMATED AVERAGE GLUCOSE: 143 MG/DL
GLUCOSE SERPL-MCNC: 125 MG/DL
HBA1C MFR BLD HPLC: 6.6 %
HCT VFR BLD CALC: 41.4 %
HGB BLD-MCNC: 13 G/DL
IMM GRANULOCYTES NFR BLD AUTO: 0.2 %
LYMPHOCYTES # BLD AUTO: 1.32 K/UL
LYMPHOCYTES NFR BLD AUTO: 20.6 %
MAN DIFF?: NORMAL
MCHC RBC-ENTMCNC: 30.4 PG
MCHC RBC-ENTMCNC: 31.4 GM/DL
MCV RBC AUTO: 96.7 FL
MONOCYTES # BLD AUTO: 0.3 K/UL
MONOCYTES NFR BLD AUTO: 4.7 %
NEUTROPHILS # BLD AUTO: 4.66 K/UL
NEUTROPHILS NFR BLD AUTO: 72.7 %
PLATELET # BLD AUTO: 235 K/UL
POTASSIUM SERPL-SCNC: 4 MMOL/L
PROT SERPL-MCNC: 6.6 G/DL
RBC # BLD: 4.28 M/UL
RBC # FLD: 13 %
SODIUM SERPL-SCNC: 140 MMOL/L
TSH SERPL-ACNC: 2.56 UIU/ML
WBC # FLD AUTO: 6.4 K/UL

## 2022-08-08 NOTE — ASSESSMENT
[FreeTextEntry1] : 1. CAD s/p FARIBA-mRCA 6/21\par - c/o chest pain \par - ECG wnl, VSS\par - check TTE\par - check MPI\par - cont DAPT, statin\par - further recommendations pending results \par \par 2. HTN\par - stable \par - cont irbes/hct 150/12.5 qd\par \par 3. HLD\par  - previous labs reviewed\par - LDL 70\par - cont lipitor 40 qd fro now\par \par RTC after w/ Dr. Irvin \par

## 2022-08-08 NOTE — HISTORY OF PRESENT ILLNESS
[FreeTextEntry1] : 71F w/ CAD s/p FARIBA-mRCA 6/21, HTN and HLD who p/w constant L chest pain raditing to her shoulder, No associated nausea, vomiting or diaphoresis. States her sx priot ot previous stent as just lightheadedness. Compliant w/ DAPT. \par \par \par ECG: NSR at 95 bpm, nl axis, LAE

## 2022-08-23 ENCOUNTER — RX RENEWAL (OUTPATIENT)
Age: 72
End: 2022-08-23

## 2022-08-25 ENCOUNTER — RX RENEWAL (OUTPATIENT)
Age: 72
End: 2022-08-25

## 2022-11-20 ENCOUNTER — RX RENEWAL (OUTPATIENT)
Age: 72
End: 2022-11-20

## 2023-02-17 ENCOUNTER — RX RENEWAL (OUTPATIENT)
Age: 73
End: 2023-02-17

## 2023-03-09 ENCOUNTER — APPOINTMENT (OUTPATIENT)
Dept: INTERNAL MEDICINE | Facility: CLINIC | Age: 73
End: 2023-03-09
Payer: MEDICARE

## 2023-03-09 DIAGNOSIS — R91.1 SOLITARY PULMONARY NODULE: ICD-10-CM

## 2023-03-09 DIAGNOSIS — N28.1 CYST OF KIDNEY, ACQUIRED: ICD-10-CM

## 2023-03-09 DIAGNOSIS — F17.200 NICOTINE DEPENDENCE, UNSPECIFIED, UNCOMPLICATED: ICD-10-CM

## 2023-03-09 DIAGNOSIS — M54.50 LOW BACK PAIN, UNSPECIFIED: ICD-10-CM

## 2023-03-09 DIAGNOSIS — R73.03 PREDIABETES.: ICD-10-CM

## 2023-03-09 PROCEDURE — 99205 OFFICE O/P NEW HI 60 MIN: CPT | Mod: GC,25

## 2023-03-09 PROCEDURE — 36415 COLL VENOUS BLD VENIPUNCTURE: CPT

## 2023-03-09 PROCEDURE — G0439: CPT

## 2023-03-09 PROCEDURE — 99406 BEHAV CHNG SMOKING 3-10 MIN: CPT

## 2023-03-09 RX ORDER — OMEPRAZOLE 20 MG/1
20 CAPSULE, DELAYED RELEASE ORAL
Qty: 90 | Refills: 0 | Status: DISCONTINUED | COMMUNITY
Start: 2019-03-17 | End: 2023-03-09

## 2023-03-09 NOTE — PHYSICAL EXAM
[Normal Rate] : normal [Normal S1] : normal S1 [Normal S2] : normal S2 [No Pitting Edema] : no pitting edema present [Rt] : varicose veins of the right leg noted [Lt] : varicose veins of the left leg noted [No CVA Tenderness] : no CVA  tenderness [No Spinal Tenderness] : no spinal tenderness [___] : [unfilled] [Normal] : affect was normal and insight and judgment were intact

## 2023-03-10 PROBLEM — R91.1 LUNG NODULE: Status: ACTIVE | Noted: 2017-02-15

## 2023-03-10 PROBLEM — N28.1 KIDNEY CYSTS: Status: ACTIVE | Noted: 2018-03-13

## 2023-03-10 NOTE — HISTORY OF PRESENT ILLNESS
[FreeTextEntry1] : back pain [de-identified] : 73 y/o F w/ PMH of CAD s/p percutaneous angioplasty, HTN, DM, HLD, B/L carotid artery disease, lung nodule in the past and OA of B/L knees, degenerative disc disease and bulging discs in lumbar presents to establish care. Pt complains of persistent back pain in lower back that radiates to hips bilaterally. She states that she does not usually take anything for the pain. Pain is worsened by wearing sneakers. She previously saw a back surgeon who recommended back surgery following an MRI, she does not want surgery and would like alternative options. \par She states that she otherwise feels very well but states that she sometimes feels a little off balance. Denies dizziness with standing, ear fullness, hearing changes, tinnitus

## 2023-03-10 NOTE — ASSESSMENT
[FreeTextEntry1] : 71 y/o F w/ PMH of CAD s/p percutaneous angioplasty, HTN, DM, HLD, B/L carotid artery disease, lung nodule in the past and OA of B/L knees, degenerative disc disease and bulging discs in lumbar presents to establish care, complains of back pain likely 2/2 to degenerative disc disease with bulging discs

## 2023-03-10 NOTE — PLAN
[FreeTextEntry1] : #Back pain\par Pt does not want to pursue surgery at this time, MRI findings consistent with stenosis and bulging discs\par - Pain mgmt: tylenol extra strength 1000mg TID PRN, heat compress\par - Pt would like to defer PT and pursue swimming personally\par - F/U Vitamin D\par \par #DM\par A1c 6.6 in 6/2022\par - F/U A1c, CMP\par \par #Renal cysts\par - Discussed need for US, will differ to next visit\par \par #Active smoker\par Stopped smoking 2 weeks ago, using nicotine patches. CT chest in 2016 showing nodule in RUL\par - CT chest for screening ordered today\par \par #HCM\par - COVID vaccinated, flu shot in fall -- will confirm dates on next visit\par - Pt would like to defer colonoscopies, mammograms and pap smears moving forward\par - DEXA ordered\par - Not interested in pneumo vax\par - Will be touching base with her phamacist regarding shingles vaccine\par - RTC in 6 weeks \par

## 2023-03-10 NOTE — HEALTH RISK ASSESSMENT
[Good] : ~his/her~  mood as  good [No] : No [No falls in past year] : Patient reported no falls in the past year [Patient declined PAP Smear] : Patient declined PAP Smear [Patient declined colonoscopy] : Patient declined colonoscopy [HIV test declined] : HIV test declined [Hepatitis C test declined] : Hepatitis C test declined [None] : None [Alone] : lives alone [Single] : single [] :  [Fully functional (bathing, dressing, toileting, transferring, walking, feeding)] : Fully functional (bathing, dressing, toileting, transferring, walking, feeding) [Fully functional (using the telephone, shopping, preparing meals, housekeeping, doing laundry, using] : Fully functional and needs no help or supervision to perform IADLs (using the telephone, shopping, preparing meals, housekeeping, doing laundry, using transportation, managing medications and managing finances) [Current] : Current [0] : 2) Feeling down, depressed, or hopeless: Not at all (0) [PHQ-2 Negative - No further assessment needed] : PHQ-2 Negative - No further assessment needed [Patient reported mammogram was normal] : Patient reported mammogram was normal [Patient reported bone density results were normal] : Patient reported bone density results were normal [< 15 Years] : < 15 Years [de-identified] : walking to and from work independently [[id number]] : 0 [LowDoseCTScan] : 09/16 [Change in mental status noted] : No change in mental status noted [Language] : denies difficulty with language [Behavior] : denies difficulty with behavior [Learning/Retaining New Information] : denies difficulty learning/retaining new information [Handling Complex Tasks] : denies difficulty handling complex tasks [Reasoning] : denies difficulty with reasoning [Spatial Ability and Orientation] : denies difficulty with spatial ability and orientation [Sexually Active] : not sexually active [High Risk Behavior] : no high risk behavior [Reports changes in hearing] : Reports no changes in hearing [Reports changes in vision] : Reports no changes in vision [MammogramDate] : 01/2018 [MammogramComments] : States she does not want to have anymore screening [BoneDensityDate] : 11/14 [FreeTextEntry2] : works at [employer] [de-identified] : stopped 2 weeks ago, under a pack a day for around 50 years

## 2023-03-10 NOTE — END OF VISIT
[] : Resident [FreeTextEntry3] : Very pleasant 73yo F who presents to establish care. Main issues are chronic LBP 2/2 known degenerative spine disease as per note. \par \par - Advised on tylenol extra strength 1g TID prn and heat packs for MSK pain. Pt prefers conservative mgmt at this time.\par - Advised on exercise - pt plans to do swimming aerobics at nearby center.\par - Advised to consider acupuncture as it can help relieve some OA related pain. \par - Check labs today. \par - DEXA ordered. \par - Agreeable for shingrix, wants to check at her own pharmacy. \par - Asked pt to bring outside vaccination report to confirm vaccination history. \par - CT chest non-contrast ordered for h/o lung nodule w/ recs for repeat imaging. \par - Can discuss future US renal/bladder for incidentally found renal cysts at future visit. \par - C/w current doses for ASA, plavix, statin, irbesartan-HCTZ. \par - RTC in 6 weeks.  [Time Spent: ___ minutes] : I have spent [unfilled] minutes of time on the encounter.

## 2023-03-10 NOTE — REVIEW OF SYSTEMS
[Joint Pain] : joint pain [Back Pain] : back pain [Negative] : Psychiatric [Joint Stiffness] : no joint stiffness [Joint Swelling] : no joint swelling [Muscle Pain] : no muscle pain

## 2023-03-13 LAB
25(OH)D3 SERPL-MCNC: 19.4 NG/ML
ALBUMIN SERPL ELPH-MCNC: 4.3 G/DL
ALP BLD-CCNC: 86 U/L
ALT SERPL-CCNC: 16 U/L
ANION GAP SERPL CALC-SCNC: 13 MMOL/L
AST SERPL-CCNC: 18 U/L
BILIRUB SERPL-MCNC: 0.3 MG/DL
BUN SERPL-MCNC: 15 MG/DL
CALCIUM SERPL-MCNC: 9.9 MG/DL
CHLORIDE SERPL-SCNC: 102 MMOL/L
CO2 SERPL-SCNC: 26 MMOL/L
CREAT SERPL-MCNC: 0.83 MG/DL
EGFR: 74 ML/MIN/1.73M2
ESTIMATED AVERAGE GLUCOSE: 137 MG/DL
GLUCOSE SERPL-MCNC: 80 MG/DL
HBA1C MFR BLD HPLC: 6.4 %
POTASSIUM SERPL-SCNC: 4.1 MMOL/L
PROT SERPL-MCNC: 6.9 G/DL
SODIUM SERPL-SCNC: 140 MMOL/L

## 2023-03-14 ENCOUNTER — RX RENEWAL (OUTPATIENT)
Age: 73
End: 2023-03-14

## 2023-04-12 ENCOUNTER — APPOINTMENT (OUTPATIENT)
Dept: RADIOLOGY | Facility: HOSPITAL | Age: 73
End: 2023-04-12
Payer: MEDICARE

## 2023-04-12 ENCOUNTER — OUTPATIENT (OUTPATIENT)
Dept: OUTPATIENT SERVICES | Facility: HOSPITAL | Age: 73
LOS: 1 days | End: 2023-04-12
Payer: MEDICARE

## 2023-04-12 ENCOUNTER — APPOINTMENT (OUTPATIENT)
Dept: CT IMAGING | Facility: HOSPITAL | Age: 73
End: 2023-04-12
Payer: MEDICARE

## 2023-04-12 DIAGNOSIS — Z41.9 ENCOUNTER FOR PROCEDURE FOR PURPOSES OTHER THAN REMEDYING HEALTH STATE, UNSPECIFIED: Chronic | ICD-10-CM

## 2023-04-12 DIAGNOSIS — Z98.890 OTHER SPECIFIED POSTPROCEDURAL STATES: Chronic | ICD-10-CM

## 2023-04-12 PROCEDURE — 77080 DXA BONE DENSITY AXIAL: CPT | Mod: 26

## 2023-04-12 PROCEDURE — 77080 DXA BONE DENSITY AXIAL: CPT

## 2023-04-12 PROCEDURE — 71250 CT THORAX DX C-: CPT | Mod: 26,MH

## 2023-04-12 PROCEDURE — 71250 CT THORAX DX C-: CPT

## 2023-04-19 ENCOUNTER — NON-APPOINTMENT (OUTPATIENT)
Age: 73
End: 2023-04-19

## 2023-04-27 ENCOUNTER — APPOINTMENT (OUTPATIENT)
Dept: GERIATRICS | Facility: CLINIC | Age: 73
End: 2023-04-27
Payer: MEDICARE

## 2023-04-27 ENCOUNTER — APPOINTMENT (OUTPATIENT)
Dept: INTERNAL MEDICINE | Facility: CLINIC | Age: 73
End: 2023-04-27

## 2023-04-27 VITALS
OXYGEN SATURATION: 100 % | SYSTOLIC BLOOD PRESSURE: 138 MMHG | WEIGHT: 158 LBS | BODY MASS INDEX: 29.85 KG/M2 | DIASTOLIC BLOOD PRESSURE: 77 MMHG | HEART RATE: 75 BPM | RESPIRATION RATE: 14 BRPM

## 2023-04-27 DIAGNOSIS — I77.9 DISORDER OF ARTERIES AND ARTERIOLES, UNSPECIFIED: ICD-10-CM

## 2023-04-27 DIAGNOSIS — M17.0 BILATERAL PRIMARY OSTEOARTHRITIS OF KNEE: ICD-10-CM

## 2023-04-27 DIAGNOSIS — E55.9 VITAMIN D DEFICIENCY, UNSPECIFIED: ICD-10-CM

## 2023-04-27 DIAGNOSIS — M25.511 PAIN IN RIGHT SHOULDER: ICD-10-CM

## 2023-04-27 PROCEDURE — 99215 OFFICE O/P EST HI 40 MIN: CPT

## 2023-04-27 PROCEDURE — 99205 OFFICE O/P NEW HI 60 MIN: CPT

## 2023-04-27 RX ORDER — ERGOCALCIFEROL 1.25 MG/1
1.25 MG CAPSULE, LIQUID FILLED ORAL WEEKLY
Qty: 12 | Refills: 0 | Status: DISCONTINUED | COMMUNITY
Start: 2023-03-13 | End: 2023-04-27

## 2023-05-03 PROBLEM — M17.0 PRIMARY OSTEOARTHRITIS OF BOTH KNEES: Status: ACTIVE | Noted: 2017-03-23

## 2023-05-03 PROBLEM — E55.9 VITAMIN D DEFICIENCY: Status: ACTIVE | Noted: 2023-03-13

## 2023-05-03 PROBLEM — I77.9 BILATERAL CAROTID ARTERY DISEASE, UNSPECIFIED TYPE: Status: ACTIVE | Noted: 2021-07-01

## 2023-05-03 PROBLEM — M25.511 RIGHT SHOULDER PAIN: Status: ACTIVE | Noted: 2019-05-31

## 2023-05-03 NOTE — HISTORY OF PRESENT ILLNESS
[FreeTextEntry1] : Ms. Mesa is a 71 y/o F w/ PMH of CAD s/p percutaneous angioplasty, HTN, DM, HLD, B/L carotid artery disease, lung nodule in the past and OA of B/L knees, degenerative disc disease and bulging discs in lumbar presents to geriatrics establish care.\par \par Main complaint today is R shoulder pain for 6 months that has worsened. Not worse when she is doing water aerobics. Does not wake her up at night. Relieved w/ aspercreme topical.  [TextBox_25] : osteopenia dexa 4/2023 [TextBox_37] : Wears glasses [TextBox_19] : polyps in colonoscopy 2013, would like to defer colonoscopies [FreeTextEntry4] : would like to defer mammo [Driving Concerns] : not driving or driving without noted concerns [[id number]] : 0

## 2023-05-03 NOTE — ASSESSMENT
[FreeTextEntry1] : - Suspect R shoulder pain 2/2 muscle strain/tightness. Rec massage for R shoulder muscle tightness. Rec OTC reemedies such as heat and lido patches. \par - C/w same meds\par - F/u w/ cards\par - Declines PT. \par - C/w water aerobics which pt enjoys.\par - RTC 2-3 months.

## 2023-05-03 NOTE — PHYSICAL EXAM
[de-identified] : Normal ROM in R shoulder active and passive movement. Slight ttp in lower scapular surrounding musculature.

## 2023-05-17 ENCOUNTER — RX RENEWAL (OUTPATIENT)
Age: 73
End: 2023-05-17

## 2023-05-23 ENCOUNTER — RX RENEWAL (OUTPATIENT)
Age: 73
End: 2023-05-23

## 2023-06-15 ENCOUNTER — NON-APPOINTMENT (OUTPATIENT)
Age: 73
End: 2023-06-15

## 2023-06-15 ENCOUNTER — APPOINTMENT (OUTPATIENT)
Dept: HEART AND VASCULAR | Facility: CLINIC | Age: 73
End: 2023-06-15
Payer: MEDICARE

## 2023-06-15 VITALS
SYSTOLIC BLOOD PRESSURE: 126 MMHG | HEIGHT: 61 IN | TEMPERATURE: 97.96 F | HEART RATE: 93 BPM | WEIGHT: 156 LBS | BODY MASS INDEX: 29.45 KG/M2 | OXYGEN SATURATION: 97 % | DIASTOLIC BLOOD PRESSURE: 64 MMHG

## 2023-06-15 DIAGNOSIS — Z87.898 PERSONAL HISTORY OF OTHER SPECIFIED CONDITIONS: ICD-10-CM

## 2023-06-15 DIAGNOSIS — R53.81 OTHER MALAISE: ICD-10-CM

## 2023-06-15 DIAGNOSIS — R53.83 OTHER MALAISE: ICD-10-CM

## 2023-06-15 PROCEDURE — 99213 OFFICE O/P EST LOW 20 MIN: CPT

## 2023-06-15 PROCEDURE — 93000 ELECTROCARDIOGRAM COMPLETE: CPT

## 2023-06-15 RX ORDER — CLOPIDOGREL BISULFATE 75 MG/1
75 TABLET, FILM COATED ORAL DAILY
Qty: 90 | Refills: 0 | Status: DISCONTINUED | COMMUNITY
Start: 2022-08-23 | End: 2023-06-15

## 2023-06-15 NOTE — REASON FOR VISIT
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

## 2023-06-15 NOTE — PHYSICAL EXAM
[No Acute Distress] : no acute distress [Normal Venous Pressure] : normal venous pressure [Normal] : clear lung fields, good air entry, no respiratory distress [No Edema] : no edema [de-identified] : faint systolic murmur

## 2023-06-16 ENCOUNTER — NON-APPOINTMENT (OUTPATIENT)
Age: 73
End: 2023-06-16

## 2023-06-16 LAB
CHOLEST SERPL-MCNC: 150 MG/DL
HDLC SERPL-MCNC: 41 MG/DL
LDLC SERPL CALC-MCNC: 71 MG/DL
NONHDLC SERPL-MCNC: 109 MG/DL
TRIGL SERPL-MCNC: 191 MG/DL

## 2023-08-09 ENCOUNTER — NON-APPOINTMENT (OUTPATIENT)
Age: 73
End: 2023-08-09

## 2023-08-09 ENCOUNTER — APPOINTMENT (OUTPATIENT)
Dept: INTERNAL MEDICINE | Facility: CLINIC | Age: 73
End: 2023-08-09
Payer: MEDICARE

## 2023-08-09 VITALS
HEIGHT: 61 IN | OXYGEN SATURATION: 95 % | WEIGHT: 155 LBS | BODY MASS INDEX: 29.27 KG/M2 | RESPIRATION RATE: 14 BRPM | TEMPERATURE: 97.8 F | DIASTOLIC BLOOD PRESSURE: 71 MMHG | SYSTOLIC BLOOD PRESSURE: 115 MMHG | HEART RATE: 83 BPM

## 2023-08-09 DIAGNOSIS — R42 DIZZINESS AND GIDDINESS: ICD-10-CM

## 2023-08-09 PROCEDURE — 99213 OFFICE O/P EST LOW 20 MIN: CPT

## 2023-08-09 NOTE — PHYSICAL EXAM
[Normal Sclera/Conjunctiva] : normal sclera/conjunctiva [EOMI] : extraocular movements intact [Supple] : supple [Normal Rate] : normal rate  [Regular Rhythm] : with a regular rhythm [Normal S1, S2] : normal S1 and S2 [No Edema] : there was no peripheral edema [Soft] : abdomen soft [Non Tender] : non-tender [Non-distended] : non-distended [Normal Bowel Sounds] : normal bowel sounds [Grossly Normal Strength/Tone] : grossly normal strength/tone [Coordination Grossly Intact] : coordination grossly intact [Normal Gait] : normal gait [Normal] : affect was normal and insight and judgment were intact

## 2023-08-09 NOTE — PLAN
[FreeTextEntry1] : vitals wnl   labs drawn in office   ecg:   will consult dr vargas; to follow up with cardio due to hx

## 2023-08-09 NOTE — HISTORY OF PRESENT ILLNESS
[FreeTextEntry1] : fu  [de-identified] : Reports some ep of lightheadedness when turning head quick for a couple of weeks  Followed up with eye dr  Denies sob, cp, and palpitations.   Some ep of headaches in the past week  Throbbing pain in the facial area  Reports hx of sinus infections  Some cough but chronic cough due to GERD  Denies fevers and rhinitis

## 2023-08-10 LAB
ALBUMIN SERPL ELPH-MCNC: 3.9 G/DL
ALP BLD-CCNC: 79 U/L
ALT SERPL-CCNC: 13 U/L
ANION GAP SERPL CALC-SCNC: 11 MMOL/L
AST SERPL-CCNC: 17 U/L
BILIRUB SERPL-MCNC: 0.2 MG/DL
BUN SERPL-MCNC: 18 MG/DL
CALCIUM SERPL-MCNC: 9.3 MG/DL
CHLORIDE SERPL-SCNC: 103 MMOL/L
CHOLEST SERPL-MCNC: 143 MG/DL
CO2 SERPL-SCNC: 27 MMOL/L
CREAT SERPL-MCNC: 0.98 MG/DL
EGFR: 61 ML/MIN/1.73M2
GLUCOSE SERPL-MCNC: 118 MG/DL
HDLC SERPL-MCNC: 41 MG/DL
LDLC SERPL CALC-MCNC: 74 MG/DL
NONHDLC SERPL-MCNC: 102 MG/DL
POTASSIUM SERPL-SCNC: 4.1 MMOL/L
PROT SERPL-MCNC: 6.5 G/DL
SODIUM SERPL-SCNC: 141 MMOL/L
T4 FREE SERPL-MCNC: 1.2 NG/DL
TRIGL SERPL-MCNC: 157 MG/DL
TSH SERPL-ACNC: 3.13 UIU/ML

## 2023-08-16 ENCOUNTER — NON-APPOINTMENT (OUTPATIENT)
Age: 73
End: 2023-08-16

## 2023-08-25 ENCOUNTER — APPOINTMENT (OUTPATIENT)
Dept: OTOLARYNGOLOGY | Facility: CLINIC | Age: 73
End: 2023-08-25

## 2023-09-14 ENCOUNTER — APPOINTMENT (OUTPATIENT)
Dept: HEART AND VASCULAR | Facility: CLINIC | Age: 73
End: 2023-09-14
Payer: MEDICARE

## 2023-09-14 VITALS
DIASTOLIC BLOOD PRESSURE: 56 MMHG | HEIGHT: 61 IN | BODY MASS INDEX: 28.7 KG/M2 | SYSTOLIC BLOOD PRESSURE: 110 MMHG | WEIGHT: 151.99 LBS | TEMPERATURE: 97.2 F | OXYGEN SATURATION: 98 % | HEART RATE: 75 BPM

## 2023-09-14 PROCEDURE — 99213 OFFICE O/P EST LOW 20 MIN: CPT

## 2023-09-14 RX ORDER — ATORVASTATIN CALCIUM 20 MG/1
20 TABLET, FILM COATED ORAL
Refills: 0 | Status: ACTIVE | COMMUNITY

## 2023-09-14 RX ORDER — EZETIMIBE 10 MG/1
10 TABLET ORAL
Qty: 90 | Refills: 3 | Status: ACTIVE | COMMUNITY
Start: 2023-09-14 | End: 1900-01-01

## 2023-09-27 ENCOUNTER — APPOINTMENT (OUTPATIENT)
Dept: OTOLARYNGOLOGY | Facility: CLINIC | Age: 73
End: 2023-09-27
Payer: MEDICARE

## 2023-09-27 VITALS
WEIGHT: 151 LBS | SYSTOLIC BLOOD PRESSURE: 88 MMHG | HEIGHT: 61 IN | TEMPERATURE: 96.7 F | HEART RATE: 90 BPM | BODY MASS INDEX: 28.51 KG/M2 | DIASTOLIC BLOOD PRESSURE: 64 MMHG

## 2023-09-27 DIAGNOSIS — H61.23 IMPACTED CERUMEN, BILATERAL: ICD-10-CM

## 2023-09-27 DIAGNOSIS — R05.3 CHRONIC COUGH: ICD-10-CM

## 2023-09-27 DIAGNOSIS — J30.89 OTHER ALLERGIC RHINITIS: ICD-10-CM

## 2023-09-27 DIAGNOSIS — Z01.818 ENCOUNTER FOR OTHER PREPROCEDURAL EXAMINATION: ICD-10-CM

## 2023-09-27 DIAGNOSIS — J32.8 OTHER CHRONIC SINUSITIS: ICD-10-CM

## 2023-09-27 DIAGNOSIS — J34.2 DEVIATED NASAL SEPTUM: ICD-10-CM

## 2023-09-27 PROCEDURE — 99203 OFFICE O/P NEW LOW 30 MIN: CPT | Mod: 25

## 2023-09-27 PROCEDURE — 69210 REMOVE IMPACTED EAR WAX UNI: CPT

## 2023-10-07 ENCOUNTER — RX RENEWAL (OUTPATIENT)
Age: 73
End: 2023-10-07

## 2023-11-12 ENCOUNTER — RX RENEWAL (OUTPATIENT)
Age: 73
End: 2023-11-12

## 2023-12-07 ENCOUNTER — APPOINTMENT (OUTPATIENT)
Dept: HEART AND VASCULAR | Facility: CLINIC | Age: 73
End: 2023-12-07
Payer: MEDICARE

## 2023-12-07 VITALS
TEMPERATURE: 97.2 F | WEIGHT: 152 LBS | BODY MASS INDEX: 28.7 KG/M2 | OXYGEN SATURATION: 97 % | HEART RATE: 78 BPM | HEIGHT: 61 IN | DIASTOLIC BLOOD PRESSURE: 68 MMHG | SYSTOLIC BLOOD PRESSURE: 144 MMHG

## 2023-12-07 VITALS — SYSTOLIC BLOOD PRESSURE: 133 MMHG | DIASTOLIC BLOOD PRESSURE: 81 MMHG

## 2023-12-07 PROCEDURE — 99213 OFFICE O/P EST LOW 20 MIN: CPT

## 2023-12-08 LAB
CHOLEST SERPL-MCNC: 117 MG/DL
HDLC SERPL-MCNC: 45 MG/DL
LDLC SERPL CALC-MCNC: 53 MG/DL
NONHDLC SERPL-MCNC: 73 MG/DL
TRIGL SERPL-MCNC: 106 MG/DL

## 2023-12-14 ENCOUNTER — APPOINTMENT (OUTPATIENT)
Dept: HEART AND VASCULAR | Facility: CLINIC | Age: 73
End: 2023-12-14
Payer: MEDICARE

## 2023-12-14 VITALS
OXYGEN SATURATION: 98 % | TEMPERATURE: 98.5 F | DIASTOLIC BLOOD PRESSURE: 78 MMHG | BODY MASS INDEX: 28.7 KG/M2 | HEART RATE: 61 BPM | HEIGHT: 61 IN | SYSTOLIC BLOOD PRESSURE: 130 MMHG | WEIGHT: 152 LBS

## 2023-12-14 DIAGNOSIS — R55 SYNCOPE AND COLLAPSE: ICD-10-CM

## 2023-12-14 PROCEDURE — 99212 OFFICE O/P EST SF 10 MIN: CPT

## 2023-12-14 NOTE — REASON FOR VISIT
[FreeTextEntry1] : Here for f/u of episodic lightheadedness, which has since resolved. Expressed concern about her CV health, but denied any chest pain, palpitations, dizziness, or other symptoms. Reviewed recent lipid determination.

## 2023-12-25 ENCOUNTER — RX RENEWAL (OUTPATIENT)
Age: 73
End: 2023-12-25

## 2024-01-22 NOTE — ASU PREOP CHECKLIST - HEIGHT IN CM
"Occupational Therapy    Visit Type: treatment  SUBJECTIVE  Patient agreed to participate in therapy this date. RN in agreement to work with patient for therapy session. ""I feel like I'm stuck in this bed. I need to get moving. \"" Patient reported she only pivots at baseline and uses her wheelchair. Patient / Family Goal: return to previous functional status    Pain     At onset of session (out of 10): 0    OBJECTIVE      Patient Activity Tolerance: no rest required       Sitting Balance  (YURIDIA = base of support)  Static      - Trial 1 details: with back unsupported, with double UE support, with double LE support and supervision  Dynamic      - Trial 1 details: supervision, with back unsupported, with double UE support and with double LE support    Standing Balance  (YURIDIA = base of support)  Firm Surface: Double Leg      - Static, Eyes Open       - Trial 1 details: contact guard and with double UE support     - Dynamic, Eyes Open       - Trial 1 details: contact guard and with double UE support       Bed Mobility  - Supine to sit: minimal assist  Provided instruction for technique, use of railing and shifting legs to side of bed. Required min assist to shift hips to edge of bed. Transfers  Assistive devices: gait belt, 2-wheeled walker  - Sit to stand: contact guard/touching/steadying assist  - Stand to sit: contact guard/touching/steadying assist  - Stand pivot: contact guard/touching/steadying assist  Presented with impulsivity, standing with walker despite instruction for environment set up. Required contact guard assist during pivot, noting decreased safety.      Interventions    Training provided: safety training, transfer training and compensatory techniques  Skilled input: as detailed above         Education:   - Present and ready to learn: patient  Education provided during session:  - Results of above outlined education: Needs reinforcement and Verbalizes understanding    ASSESSMENT   Progress: " progressing toward goals  Interferring components: cognitive deficits and decreased activity tolerance    Discharge needs based on today's assessment:  - Current level of function: significantly below baseline level of function  - Therapy needs at discharge: therapy 5 or more times per week  - Activities of daily living (ADLs) requiring support at discharge: bed mobility, transfers, dressing, grooming, bathing and toileting  - Impairments that require further therapy intervention: strength, activity tolerance, balance, safety awareness and cognition  AM-PAC  - Prior Level of Function: IND/MOD I ([facility] 22-24)       Key: MOD A=moderate assistance, IND/MOD I=independent/modified independent  - Generalized Current Level of Function     - Current Self-Cares: 20       Scoring Key= >21 Modified Independent; 20-21 Supervision; 18-19 Minimal assist; 13-17 Moderate assist; 9-12 Max assist; <9 Total assist      PLAN (while hospitalized)  Suggestions for next session as indicated: pivot transfer to commode, toileting routine    OT Frequency: 3-5 x per week      PT/OT Mobility Equipment for Discharge: owns walker, w/c per patient report  PT/OT ADL Equipment for Discharge: Shower chair - owns  Agreement to plan and goals: patient agrees with goals and treatment plan      GOALS  Review Date: 1/25/2024  Long Term Goals: (to be met by time of discharge from hospital)  Grooming: Patient will complete grooming tasks at sink modified independent. Lower body dressing: Patient will complete lower body dressing modified independent. Toileting: Patient will complete toileting modified independent. Bathing: Patient will complete bathing sitting at sinkmodified independent Toilet transfer: Patient will complete toilet transfer with 2-wheeled walker, modified independent. Home setting transfer: Patient will complete home setting transfers with 2-wheeled walker, modified independent.      Documented in the chart in the following areas: Assessment/Plan.     Patient at End of Session:   Location: in chair  Safety measures: call light within reach, alarm system in place/re-engaged and lines intact  Handoff to: nurse      Therapy procedure time and total treatment time can be found documented on the Time Entry flowsheet 154.94

## 2024-02-09 ENCOUNTER — RX RENEWAL (OUTPATIENT)
Age: 74
End: 2024-02-09

## 2024-03-11 ENCOUNTER — NON-APPOINTMENT (OUTPATIENT)
Age: 74
End: 2024-03-11

## 2024-03-11 ENCOUNTER — APPOINTMENT (OUTPATIENT)
Dept: INTERNAL MEDICINE | Facility: CLINIC | Age: 74
End: 2024-03-11
Payer: MEDICARE

## 2024-03-11 VITALS
SYSTOLIC BLOOD PRESSURE: 136 MMHG | HEIGHT: 59.45 IN | HEART RATE: 80 BPM | DIASTOLIC BLOOD PRESSURE: 85 MMHG | BODY MASS INDEX: 27.85 KG/M2 | WEIGHT: 140 LBS | TEMPERATURE: 98.3 F | OXYGEN SATURATION: 98 %

## 2024-03-11 VITALS
DIASTOLIC BLOOD PRESSURE: 77 MMHG | WEIGHT: 151 LBS | OXYGEN SATURATION: 96 % | HEIGHT: 59.45 IN | SYSTOLIC BLOOD PRESSURE: 128 MMHG | TEMPERATURE: 97.9 F | BODY MASS INDEX: 30.04 KG/M2 | HEART RATE: 68 BPM

## 2024-03-11 DIAGNOSIS — E78.5 HYPERLIPIDEMIA, UNSPECIFIED: ICD-10-CM

## 2024-03-11 DIAGNOSIS — I73.9 PERIPHERAL VASCULAR DISEASE, UNSPECIFIED: ICD-10-CM

## 2024-03-11 DIAGNOSIS — Z98.61 ATHEROSCLEROTIC HEART DISEASE OF NATIVE CORONARY ARTERY W/OUT ANGINA PECTORIS: ICD-10-CM

## 2024-03-11 DIAGNOSIS — I25.10 ATHEROSCLEROTIC HEART DISEASE OF NATIVE CORONARY ARTERY W/OUT ANGINA PECTORIS: ICD-10-CM

## 2024-03-11 DIAGNOSIS — I10 ESSENTIAL (PRIMARY) HYPERTENSION: ICD-10-CM

## 2024-03-11 DIAGNOSIS — Z00.00 ENCOUNTER FOR GENERAL ADULT MEDICAL EXAMINATION W/OUT ABNORMAL FINDINGS: ICD-10-CM

## 2024-03-11 PROCEDURE — 36415 COLL VENOUS BLD VENIPUNCTURE: CPT

## 2024-03-11 PROCEDURE — G0439: CPT

## 2024-03-11 NOTE — HISTORY OF PRESENT ILLNESS
[FreeTextEntry1] : Still working Daily  Has pain in upper thigh on left     [de-identified] : This pain has been present for a while

## 2024-03-11 NOTE — HEALTH RISK ASSESSMENT
[No] : No [Good] : ~his/her~  mood as  good [No falls in past year] : Patient reported no falls in the past year [0] : 2) Feeling down, depressed, or hopeless: Not at all (0) [Patient declined mammogram] : Patient declined mammogram [Patient declined PAP Smear] : Patient declined PAP Smear [Patient reported bone density results were abnormal] : Patient reported bone density results were abnormal [Patient declined colonoscopy] : Patient declined colonoscopy [[id number]] : 0 [BoneDensityDate] : April 2023 [BoneDensityComments] : Osteopenia

## 2024-03-11 NOTE — ASSESSMENT
[FreeTextEntry1] : Exam is stable except for this inguinal pain. Likely has hernia. Will get ultrasound of that area.  All labs to be done and EKG.                                                                                                                                                                                                                                                                                                                                                                              Addendum;  EKG normal  Further plans after review of studies

## 2024-03-11 NOTE — PHYSICAL EXAM
[Normal] : normal gait, coordination grossly intact, no focal deficits and deep tendon reflexes were 2+ and symmetric [de-identified] : Inguinal pain ; Likely a hernia

## 2024-03-12 LAB
25(OH)D3 SERPL-MCNC: 40.1 NG/ML
ALBUMIN SERPL ELPH-MCNC: 4.2 G/DL
ALP BLD-CCNC: 83 U/L
ALT SERPL-CCNC: 19 U/L
ANION GAP SERPL CALC-SCNC: 10 MMOL/L
APPEARANCE: CLEAR
AST SERPL-CCNC: 18 U/L
BACTERIA: NEGATIVE /HPF
BILIRUB SERPL-MCNC: 0.4 MG/DL
BILIRUBIN URINE: NEGATIVE
BLOOD URINE: NEGATIVE
BUN SERPL-MCNC: 17 MG/DL
CALCIUM SERPL-MCNC: 9.4 MG/DL
CAST: 0 /LPF
CHLORIDE SERPL-SCNC: 102 MMOL/L
CHOLEST SERPL-MCNC: 114 MG/DL
CO2 SERPL-SCNC: 26 MMOL/L
COLOR: YELLOW
CREAT SERPL-MCNC: 0.92 MG/DL
EGFR: 65 ML/MIN/1.73M2
EPITHELIAL CELLS: 2 /HPF
ESTIMATED AVERAGE GLUCOSE: 137 MG/DL
GLUCOSE QUALITATIVE U: NEGATIVE MG/DL
GLUCOSE SERPL-MCNC: 96 MG/DL
HBA1C MFR BLD HPLC: 6.4 %
HCT VFR BLD CALC: 42.5 %
HDLC SERPL-MCNC: 48 MG/DL
HGB BLD-MCNC: 13.4 G/DL
KETONES URINE: NEGATIVE MG/DL
LDLC SERPL CALC-MCNC: 51 MG/DL
LEUKOCYTE ESTERASE URINE: ABNORMAL
MCHC RBC-ENTMCNC: 30.5 PG
MCHC RBC-ENTMCNC: 31.5 GM/DL
MCV RBC AUTO: 96.8 FL
MICROSCOPIC-UA: NORMAL
NITRITE URINE: NEGATIVE
NONHDLC SERPL-MCNC: 66 MG/DL
PH URINE: 5.5
PLATELET # BLD AUTO: 233 K/UL
POTASSIUM SERPL-SCNC: 4.8 MMOL/L
PROT SERPL-MCNC: 6.9 G/DL
PROTEIN URINE: NEGATIVE MG/DL
RBC # BLD: 4.39 M/UL
RBC # FLD: 12.5 %
RED BLOOD CELLS URINE: 0 /HPF
SODIUM SERPL-SCNC: 138 MMOL/L
SPECIFIC GRAVITY URINE: 1.01
T4 FREE SERPL-MCNC: 1.3 NG/DL
TRIGL SERPL-MCNC: 74 MG/DL
TSH SERPL-ACNC: 2.81 UIU/ML
UROBILINOGEN URINE: 0.2 MG/DL
WBC # FLD AUTO: 6.7 K/UL
WHITE BLOOD CELLS URINE: 1 /HPF

## 2024-03-14 ENCOUNTER — OUTPATIENT (OUTPATIENT)
Dept: OUTPATIENT SERVICES | Facility: HOSPITAL | Age: 74
LOS: 1 days | End: 2024-03-14
Payer: MEDICARE

## 2024-03-14 ENCOUNTER — APPOINTMENT (OUTPATIENT)
Dept: ULTRASOUND IMAGING | Facility: HOSPITAL | Age: 74
End: 2024-03-14
Payer: MEDICARE

## 2024-03-14 ENCOUNTER — RESULT REVIEW (OUTPATIENT)
Age: 74
End: 2024-03-14

## 2024-03-14 DIAGNOSIS — Z98.890 OTHER SPECIFIED POSTPROCEDURAL STATES: Chronic | ICD-10-CM

## 2024-03-14 DIAGNOSIS — Z41.9 ENCOUNTER FOR PROCEDURE FOR PURPOSES OTHER THAN REMEDYING HEALTH STATE, UNSPECIFIED: Chronic | ICD-10-CM

## 2024-03-14 PROCEDURE — 76705 ECHO EXAM OF ABDOMEN: CPT

## 2024-03-14 PROCEDURE — 76705 ECHO EXAM OF ABDOMEN: CPT | Mod: 26

## 2024-03-15 DIAGNOSIS — K40.21 BILATERAL INGUINAL HERNIA, W/OUT OBSTRUCTION OR GANGRENE, RECURRENT: ICD-10-CM

## 2024-03-22 ENCOUNTER — RX RENEWAL (OUTPATIENT)
Age: 74
End: 2024-03-22

## 2024-05-06 ENCOUNTER — APPOINTMENT (OUTPATIENT)
Dept: INTERNAL MEDICINE | Facility: CLINIC | Age: 74
End: 2024-05-06
Payer: MEDICARE

## 2024-05-06 VITALS
HEIGHT: 60 IN | HEART RATE: 79 BPM | SYSTOLIC BLOOD PRESSURE: 108 MMHG | WEIGHT: 151 LBS | BODY MASS INDEX: 29.64 KG/M2 | DIASTOLIC BLOOD PRESSURE: 58 MMHG | OXYGEN SATURATION: 95 % | TEMPERATURE: 97.2 F

## 2024-05-06 DIAGNOSIS — M25.552 PAIN IN LEFT HIP: ICD-10-CM

## 2024-05-06 PROCEDURE — 99214 OFFICE O/P EST MOD 30 MIN: CPT

## 2024-05-06 NOTE — PHYSICAL EXAM
[Normal] : no acute distress, well nourished, well developed and well-appearing [Normal Sclera/Conjunctiva] : normal sclera/conjunctiva [EOMI] : extraocular movements intact

## 2024-05-07 ENCOUNTER — RX RENEWAL (OUTPATIENT)
Age: 74
End: 2024-05-07

## 2024-05-07 RX ORDER — IRBESARTAN AND HYDROCHLOROTHIAZIDE 150; 12.5 MG/1; MG/1
150-12.5 TABLET ORAL
Qty: 90 | Refills: 0 | Status: ACTIVE | COMMUNITY
Start: 2017-12-13 | End: 1900-01-01

## 2024-05-09 NOTE — HISTORY OF PRESENT ILLNESS
[FreeTextEntry1] : left leg pain [de-identified] : ABD US & CT done recently - all wnl   Ongoing left hip pain  Worsened by activity - has to stop every time she walks  Relieved by rest  Denies pain radiating elsewhere or issues with bowel/bladder

## 2024-05-09 NOTE — HISTORY OF PRESENT ILLNESS
[FreeTextEntry1] : left leg pain [de-identified] : ABD US & CT done recently - all wnl   Ongoing left hip pain  Worsened by activity - has to stop every time she walks  Relieved by rest  Denies pain radiating elsewhere or issues with bowel/bladder

## 2024-05-24 DIAGNOSIS — M85.80 OTHER SPECIFIED DISORDERS OF BONE DENSITY AND STRUCTURE, UNSPECIFIED SITE: ICD-10-CM

## 2024-05-24 RX ORDER — CHOLECALCIFEROL (VITAMIN D3) 50 MCG
50 MCG TABLET ORAL
Qty: 90 | Refills: 2 | Status: ACTIVE | COMMUNITY
Start: 2024-05-24 | End: 1900-01-01

## 2024-06-06 ENCOUNTER — APPOINTMENT (OUTPATIENT)
Dept: HEART AND VASCULAR | Facility: CLINIC | Age: 74
End: 2024-06-06
Payer: MEDICARE

## 2024-06-06 VITALS
BODY MASS INDEX: 29.84 KG/M2 | HEART RATE: 79 BPM | WEIGHT: 152 LBS | SYSTOLIC BLOOD PRESSURE: 114 MMHG | TEMPERATURE: 98.1 F | DIASTOLIC BLOOD PRESSURE: 70 MMHG | HEIGHT: 60 IN | OXYGEN SATURATION: 98 %

## 2024-06-06 DIAGNOSIS — R42 DIZZINESS AND GIDDINESS: ICD-10-CM

## 2024-06-06 PROCEDURE — 99213 OFFICE O/P EST LOW 20 MIN: CPT

## 2024-06-06 PROCEDURE — 93246 EXT ECG>7D<15D RECORDING: CPT

## 2024-06-06 NOTE — REASON FOR VISIT
[FreeTextEntry1] : Here for new lightheadedness. Symptoms present for a few weeks. Has had frequent episodes of lightheadedness with "head fullness" now occurring nearly daily, lasting minutes at a time. No clear pattern. Not related to activity. No associated symptoms except mild visual burring and feeling hot. No nausea. Has had increased pain in back and hips. No change in meds. Otherwise feels OK.

## 2024-06-23 ENCOUNTER — RX RENEWAL (OUTPATIENT)
Age: 74
End: 2024-06-23

## 2024-06-23 RX ORDER — ATORVASTATIN CALCIUM 40 MG/1
40 TABLET, FILM COATED ORAL
Qty: 90 | Refills: 0 | Status: ACTIVE | COMMUNITY
Start: 2023-12-25 | End: 1900-01-01

## 2024-06-24 ENCOUNTER — APPOINTMENT (OUTPATIENT)
Dept: ORTHOPEDIC SURGERY | Facility: CLINIC | Age: 74
End: 2024-06-24

## 2024-06-26 PROCEDURE — 93248 EXT ECG>7D<15D REV&INTERPJ: CPT

## 2024-07-09 ENCOUNTER — RESULT REVIEW (OUTPATIENT)
Age: 74
End: 2024-07-09

## 2024-07-09 ENCOUNTER — OUTPATIENT (OUTPATIENT)
Dept: OUTPATIENT SERVICES | Facility: HOSPITAL | Age: 74
LOS: 1 days | End: 2024-07-09
Payer: MEDICARE

## 2024-07-09 ENCOUNTER — APPOINTMENT (OUTPATIENT)
Dept: ORTHOPEDIC SURGERY | Facility: CLINIC | Age: 74
End: 2024-07-09
Payer: MEDICARE

## 2024-07-09 VITALS
BODY MASS INDEX: 29.64 KG/M2 | SYSTOLIC BLOOD PRESSURE: 101 MMHG | DIASTOLIC BLOOD PRESSURE: 61 MMHG | HEART RATE: 77 BPM | HEIGHT: 60 IN | OXYGEN SATURATION: 96 % | WEIGHT: 151 LBS

## 2024-07-09 DIAGNOSIS — Z87.39 PERSONAL HISTORY OF OTHER DISEASES OF THE MUSCULOSKELETAL SYSTEM AND CONNECTIVE TISSUE: ICD-10-CM

## 2024-07-09 DIAGNOSIS — Z86.39 PERSONAL HISTORY OF OTHER ENDOCRINE, NUTRITIONAL AND METABOLIC DISEASE: ICD-10-CM

## 2024-07-09 DIAGNOSIS — Z98.890 OTHER SPECIFIED POSTPROCEDURAL STATES: Chronic | ICD-10-CM

## 2024-07-09 DIAGNOSIS — Z41.9 ENCOUNTER FOR PROCEDURE FOR PURPOSES OTHER THAN REMEDYING HEALTH STATE, UNSPECIFIED: Chronic | ICD-10-CM

## 2024-07-09 DIAGNOSIS — Z86.79 PERSONAL HISTORY OF OTHER DISEASES OF THE CIRCULATORY SYSTEM: ICD-10-CM

## 2024-07-09 DIAGNOSIS — M16.12 UNILATERAL PRIMARY OSTEOARTHRITIS, LEFT HIP: ICD-10-CM

## 2024-07-09 PROCEDURE — 73502 X-RAY EXAM HIP UNI 2-3 VIEWS: CPT | Mod: 26,LT

## 2024-07-09 PROCEDURE — 99203 OFFICE O/P NEW LOW 30 MIN: CPT

## 2024-07-09 PROCEDURE — 73502 X-RAY EXAM HIP UNI 2-3 VIEWS: CPT

## 2024-08-26 ENCOUNTER — RX RENEWAL (OUTPATIENT)
Age: 74
End: 2024-08-26

## 2024-09-05 ENCOUNTER — APPOINTMENT (OUTPATIENT)
Dept: INTERNAL MEDICINE | Facility: CLINIC | Age: 74
End: 2024-09-05
Payer: MEDICARE

## 2024-09-05 VITALS
HEIGHT: 60 IN | SYSTOLIC BLOOD PRESSURE: 108 MMHG | HEART RATE: 93 BPM | OXYGEN SATURATION: 96 % | DIASTOLIC BLOOD PRESSURE: 61 MMHG | BODY MASS INDEX: 29.45 KG/M2 | WEIGHT: 150 LBS | TEMPERATURE: 97.6 F

## 2024-09-05 DIAGNOSIS — M17.12 UNILATERAL PRIMARY OSTEOARTHRITIS, LEFT KNEE: ICD-10-CM

## 2024-09-05 PROCEDURE — 99213 OFFICE O/P EST LOW 20 MIN: CPT

## 2024-09-05 NOTE — PHYSICAL EXAM
[No Acute Distress] : no acute distress [Well Nourished] : well nourished [Well Developed] : well developed [Well-Appearing] : well-appearing [Normal Sclera/Conjunctiva] : normal sclera/conjunctiva [PERRL] : pupils equal round and reactive to light [EOMI] : extraocular movements intact [Normal Outer Ear/Nose] : the outer ears and nose were normal in appearance [Normal Oropharynx] : the oropharynx was normal [No JVD] : no jugular venous distention [No Lymphadenopathy] : no lymphadenopathy [Supple] : supple [Thyroid Normal, No Nodules] : the thyroid was normal and there were no nodules present [No Respiratory Distress] : no respiratory distress  [No Accessory Muscle Use] : no accessory muscle use [Clear to Auscultation] : lungs were clear to auscultation bilaterally [Normal Rate] : normal rate  [Regular Rhythm] : with a regular rhythm [Normal S1, S2] : normal S1 and S2 [No Murmur] : no murmur heard [No Carotid Bruits] : no carotid bruits [No Abdominal Bruit] : a ~M bruit was not heard ~T in the abdomen [No Varicosities] : no varicosities [Pedal Pulses Present] : the pedal pulses are present [No Edema] : there was no peripheral edema [No Palpable Aorta] : no palpable aorta [No Extremity Clubbing/Cyanosis] : no extremity clubbing/cyanosis [Soft] : abdomen soft [Non Tender] : non-tender [Non-distended] : non-distended [No Masses] : no abdominal mass palpated [No HSM] : no HSM [Normal Bowel Sounds] : normal bowel sounds [Normal Posterior Cervical Nodes] : no posterior cervical lymphadenopathy [Normal Anterior Cervical Nodes] : no anterior cervical lymphadenopathy [No CVA Tenderness] : no CVA  tenderness [No Spinal Tenderness] : no spinal tenderness [No Rash] : no rash [Coordination Grossly Intact] : coordination grossly intact [No Focal Deficits] : no focal deficits [Normal Gait] : normal gait [Deep Tendon Reflexes (DTR)] : deep tendon reflexes were 2+ and symmetric [Normal Affect] : the affect was normal [Normal Insight/Judgement] : insight and judgment were intact [de-identified] : Left knee pain

## 2024-09-05 NOTE — HISTORY OF PRESENT ILLNESS
[FreeTextEntry1] : Chief complaint Left knee Pain with walking ;  Having difficulty walking  Has pain when sitting  [de-identified] : Dr Finnegan follow up noted; Physical therapy recommended  Not taking NSAIA

## 2024-09-05 NOTE — ASSESSMENT
[FreeTextEntry1] : Will try physical therapy Will likely need knee replacement  Does not want meds for pain

## 2024-09-21 ENCOUNTER — RX RENEWAL (OUTPATIENT)
Age: 74
End: 2024-09-21

## 2024-09-25 ENCOUNTER — APPOINTMENT (OUTPATIENT)
Dept: OTOLARYNGOLOGY | Facility: CLINIC | Age: 74
End: 2024-09-25
Payer: MEDICARE

## 2024-09-25 VITALS
SYSTOLIC BLOOD PRESSURE: 96 MMHG | HEART RATE: 77 BPM | WEIGHT: 154 LBS | HEIGHT: 60 IN | BODY MASS INDEX: 30.23 KG/M2 | TEMPERATURE: 96.2 F | DIASTOLIC BLOOD PRESSURE: 67 MMHG

## 2024-09-25 DIAGNOSIS — H93.8X1 OTHER SPECIFIED DISORDERS OF RIGHT EAR: ICD-10-CM

## 2024-09-25 DIAGNOSIS — Z87.39 PERSONAL HISTORY OF OTHER DISEASES OF THE MUSCULOSKELETAL SYSTEM AND CONNECTIVE TISSUE: ICD-10-CM

## 2024-09-25 DIAGNOSIS — R55 SYNCOPE AND COLLAPSE: ICD-10-CM

## 2024-09-25 DIAGNOSIS — M79.89 OTHER SPECIFIED SOFT TISSUE DISORDERS: ICD-10-CM

## 2024-09-25 DIAGNOSIS — J32.8 OTHER CHRONIC SINUSITIS: ICD-10-CM

## 2024-09-25 DIAGNOSIS — Z87.898 PERSONAL HISTORY OF OTHER SPECIFIED CONDITIONS: ICD-10-CM

## 2024-09-25 DIAGNOSIS — H61.23 IMPACTED CERUMEN, BILATERAL: ICD-10-CM

## 2024-09-25 DIAGNOSIS — J30.89 OTHER ALLERGIC RHINITIS: ICD-10-CM

## 2024-09-25 PROCEDURE — 99213 OFFICE O/P EST LOW 20 MIN: CPT | Mod: 25

## 2024-09-25 PROCEDURE — 69210 REMOVE IMPACTED EAR WAX UNI: CPT

## 2024-09-25 RX ORDER — LORATADINE 10 MG/1
10 TABLET ORAL
Refills: 0 | Status: ACTIVE | COMMUNITY

## 2024-09-25 NOTE — CONSULT LETTER
[Dear  ___] : Dear  [unfilled], [Please see my note below.] : Please see my note below. [Consult Closing:] : Thank you very much for allowing me to participate in the care of this patient.  If you have any questions, please do not hesitate to contact me. [Sincerely,] : Sincerely, [Courtesy Letter:] : I had the pleasure of seeing your patient, [unfilled], in my office today. [FreeTextEntry2] : Blanca Castillo MD [address] [place] [address], [address] [place] [address] [FreeTextEntry3] :  Brittani Zelaya MD  Otolaryngology, Head and Neck Surgery

## 2024-09-25 NOTE — ASSESSMENT
[FreeTextEntry1] : Ms. LAZARO is a 73-year-old woman who was evaluated for the following issues today:  1.) allergic rhinitis, seasonal and perennial, controlled on loratadine, which she takes daily. Chronic sinusitis, without recurrent acute exacerbation for at least 1 year.  --> continue loratadine daily  2.) Right ear clogging due to cerumen impaction, worse on right.  Wax removed bilaterally today.  Ear clogging resolved after wax removal.  Collapsing EAC cartilage makes wax buildup more likely.  3.) Dermatology referral information shared with her.  She wants to get fully body exam.  Return in 1 year; sooner if needed.  Detail Level: Detailed Plan: Patient is currently breastfeeding. She is willing to pump and dump if need be. She would like cyst excised by Dr. Ga. She is to finish Clindamycin and keep area clean with topical Polysporin and covered until no longer draining and no open sore present.

## 2024-09-25 NOTE — HISTORY OF PRESENT ILLNESS
[de-identified] : PMH:  HTN, HLD, prediabetes, CAD s/p stent, osteoporosis, back pain, knee pain.  INITIAL VISIT 9/27/2023 Ms. LAZARO is a 72-year-old woman who was referred by Dr. MACIEL for sinus and other issues. Hx of recurrent sinus infections but only had 2 episodes since start of the COVID pandemic 3 years ago.  Most recent infection occurred 2 months ago -- pressure in forehead/between eyes, over cheeks -- and resolved with OTC medication.  The other sinus infection was treated with antibiotics after she went to urgent care. Today she feels well.  Has chronic postnasal drip.  Has chronic cough, usually wet, for 40+ years, no change after smoking cessation or 2 years of GERD treatment.  Cough does not disturb her sleep.   No hoarseness or trouble swallowing. Allergies to pollen, dogs, dust, mold, ragweed.  Takes loratadine daily. Looking for a new ENT doctor since Dr. Ventura retired a few years ago.  Saw Dr. Burrell, who removed wax, but she does not intend to go back to him.  VISIT 9/25/2024 Ms. CRUZ reports that she had no sinus infection since I saw her 1 year ago.  She is taking loratadine daily. One day, she did not take the medication and "felt strange, like my body was itching from the inside."  She resumed the medication. Right ear has felt clogged for about a week.  No change in hearing.  Occasional tinnitus. No vertigo. In PT for her left knee pain.  Otherwise, no change in medical history in the past 1 year.

## 2024-10-07 ENCOUNTER — APPOINTMENT (OUTPATIENT)
Dept: ORTHOPEDIC SURGERY | Facility: CLINIC | Age: 74
End: 2024-10-07
Payer: MEDICARE

## 2024-10-07 VITALS
DIASTOLIC BLOOD PRESSURE: 82 MMHG | HEIGHT: 60 IN | BODY MASS INDEX: 30.23 KG/M2 | SYSTOLIC BLOOD PRESSURE: 135 MMHG | OXYGEN SATURATION: 95 % | WEIGHT: 154 LBS | HEART RATE: 88 BPM

## 2024-10-07 DIAGNOSIS — I83.93 ASYMPTOMATIC VARICOSE VEINS OF BILATERAL LOWER EXTREMITIES: ICD-10-CM

## 2024-10-07 DIAGNOSIS — M16.12 UNILATERAL PRIMARY OSTEOARTHRITIS, LEFT HIP: ICD-10-CM

## 2024-10-07 PROCEDURE — 99213 OFFICE O/P EST LOW 20 MIN: CPT

## 2024-11-22 ENCOUNTER — RX RENEWAL (OUTPATIENT)
Age: 74
End: 2024-11-22

## 2024-12-20 ENCOUNTER — RX RENEWAL (OUTPATIENT)
Age: 74
End: 2024-12-20

## 2025-01-23 ENCOUNTER — NON-APPOINTMENT (OUTPATIENT)
Age: 75
End: 2025-01-23

## 2025-01-23 ENCOUNTER — APPOINTMENT (OUTPATIENT)
Dept: HEART AND VASCULAR | Facility: CLINIC | Age: 75
End: 2025-01-23
Payer: MEDICARE

## 2025-01-23 VITALS
HEART RATE: 90 BPM | OXYGEN SATURATION: 95 % | WEIGHT: 154 LBS | BODY MASS INDEX: 30.23 KG/M2 | HEIGHT: 60 IN | DIASTOLIC BLOOD PRESSURE: 60 MMHG | SYSTOLIC BLOOD PRESSURE: 118 MMHG | TEMPERATURE: 94.82 F

## 2025-01-23 DIAGNOSIS — I25.10 ATHEROSCLEROTIC HEART DISEASE OF NATIVE CORONARY ARTERY W/OUT ANGINA PECTORIS: ICD-10-CM

## 2025-01-23 DIAGNOSIS — R42 DIZZINESS AND GIDDINESS: ICD-10-CM

## 2025-01-23 DIAGNOSIS — I10 ESSENTIAL (PRIMARY) HYPERTENSION: ICD-10-CM

## 2025-01-23 PROCEDURE — 99213 OFFICE O/P EST LOW 20 MIN: CPT

## 2025-01-23 PROCEDURE — 93000 ELECTROCARDIOGRAM COMPLETE: CPT

## 2025-02-20 ENCOUNTER — RX RENEWAL (OUTPATIENT)
Age: 75
End: 2025-02-20

## 2025-02-22 ENCOUNTER — RX RENEWAL (OUTPATIENT)
Age: 75
End: 2025-02-22

## 2025-02-24 ENCOUNTER — OUTPATIENT (OUTPATIENT)
Dept: OUTPATIENT SERVICES | Facility: HOSPITAL | Age: 75
LOS: 1 days | End: 2025-02-24
Payer: MEDICARE

## 2025-02-24 ENCOUNTER — RESULT REVIEW (OUTPATIENT)
Age: 75
End: 2025-02-24

## 2025-02-24 ENCOUNTER — APPOINTMENT (OUTPATIENT)
Dept: ORTHOPEDIC SURGERY | Facility: CLINIC | Age: 75
End: 2025-02-24
Payer: MEDICARE

## 2025-02-24 VITALS
BODY MASS INDEX: 31.61 KG/M2 | SYSTOLIC BLOOD PRESSURE: 138 MMHG | HEIGHT: 60 IN | DIASTOLIC BLOOD PRESSURE: 79 MMHG | HEART RATE: 78 BPM | OXYGEN SATURATION: 96 % | WEIGHT: 161 LBS

## 2025-02-24 DIAGNOSIS — M16.12 UNILATERAL PRIMARY OSTEOARTHRITIS, LEFT HIP: ICD-10-CM

## 2025-02-24 DIAGNOSIS — Z98.890 OTHER SPECIFIED POSTPROCEDURAL STATES: Chronic | ICD-10-CM

## 2025-02-24 DIAGNOSIS — Z41.9 ENCOUNTER FOR PROCEDURE FOR PURPOSES OTHER THAN REMEDYING HEALTH STATE, UNSPECIFIED: Chronic | ICD-10-CM

## 2025-02-24 DIAGNOSIS — M17.12 UNILATERAL PRIMARY OSTEOARTHRITIS, LEFT KNEE: ICD-10-CM

## 2025-02-24 PROCEDURE — 73564 X-RAY EXAM KNEE 4 OR MORE: CPT

## 2025-02-24 PROCEDURE — 73564 X-RAY EXAM KNEE 4 OR MORE: CPT | Mod: 26,LT

## 2025-02-24 PROCEDURE — 73502 X-RAY EXAM HIP UNI 2-3 VIEWS: CPT | Mod: 26,LT

## 2025-02-24 PROCEDURE — 99213 OFFICE O/P EST LOW 20 MIN: CPT

## 2025-02-24 PROCEDURE — 73502 X-RAY EXAM HIP UNI 2-3 VIEWS: CPT

## 2025-03-11 ENCOUNTER — APPOINTMENT (OUTPATIENT)
Dept: INTERNAL MEDICINE | Facility: CLINIC | Age: 75
End: 2025-03-11
Payer: MEDICARE

## 2025-03-11 VITALS
OXYGEN SATURATION: 95 % | WEIGHT: 154 LBS | SYSTOLIC BLOOD PRESSURE: 98 MMHG | DIASTOLIC BLOOD PRESSURE: 63 MMHG | HEART RATE: 74 BPM | BODY MASS INDEX: 30.23 KG/M2 | TEMPERATURE: 98 F | HEIGHT: 60 IN

## 2025-03-11 VITALS — SYSTOLIC BLOOD PRESSURE: 120 MMHG | DIASTOLIC BLOOD PRESSURE: 70 MMHG

## 2025-03-11 DIAGNOSIS — Z00.00 ENCOUNTER FOR GENERAL ADULT MEDICAL EXAMINATION W/OUT ABNORMAL FINDINGS: ICD-10-CM

## 2025-03-11 DIAGNOSIS — M16.12 UNILATERAL PRIMARY OSTEOARTHRITIS, LEFT HIP: ICD-10-CM

## 2025-03-11 DIAGNOSIS — I25.10 ATHEROSCLEROTIC HEART DISEASE OF NATIVE CORONARY ARTERY W/OUT ANGINA PECTORIS: ICD-10-CM

## 2025-03-11 DIAGNOSIS — M17.0 BILATERAL PRIMARY OSTEOARTHRITIS OF KNEE: ICD-10-CM

## 2025-03-11 DIAGNOSIS — E78.5 HYPERLIPIDEMIA, UNSPECIFIED: ICD-10-CM

## 2025-03-11 DIAGNOSIS — I10 ESSENTIAL (PRIMARY) HYPERTENSION: ICD-10-CM

## 2025-03-11 DIAGNOSIS — M85.80 OTHER SPECIFIED DISORDERS OF BONE DENSITY AND STRUCTURE, UNSPECIFIED SITE: ICD-10-CM

## 2025-03-11 DIAGNOSIS — Z98.61 ATHEROSCLEROTIC HEART DISEASE OF NATIVE CORONARY ARTERY W/OUT ANGINA PECTORIS: ICD-10-CM

## 2025-03-11 PROCEDURE — 36415 COLL VENOUS BLD VENIPUNCTURE: CPT

## 2025-03-11 PROCEDURE — G0439: CPT

## 2025-03-12 LAB
25(OH)D3 SERPL-MCNC: 28 NG/ML
ALBUMIN SERPL ELPH-MCNC: 4.1 G/DL
ALP BLD-CCNC: 86 U/L
ALT SERPL-CCNC: 26 U/L
ANION GAP SERPL CALC-SCNC: 12 MMOL/L
APPEARANCE: CLEAR
AST SERPL-CCNC: 22 U/L
BACTERIA: NEGATIVE /HPF
BILIRUB SERPL-MCNC: 0.5 MG/DL
BILIRUBIN URINE: NEGATIVE
BLOOD URINE: NEGATIVE
BUN SERPL-MCNC: 20 MG/DL
CALCIUM SERPL-MCNC: 9.8 MG/DL
CAST: 4 /LPF
CHLORIDE SERPL-SCNC: 102 MMOL/L
CHOLEST SERPL-MCNC: 222 MG/DL
CO2 SERPL-SCNC: 25 MMOL/L
COLOR: YELLOW
CREAT SERPL-MCNC: 0.98 MG/DL
EGFRCR SERPLBLD CKD-EPI 2021: 60 ML/MIN/1.73M2
EPITHELIAL CELLS: 5 /HPF
ESTIMATED AVERAGE GLUCOSE: 146 MG/DL
GLUCOSE QUALITATIVE U: NEGATIVE MG/DL
GLUCOSE SERPL-MCNC: 86 MG/DL
HBA1C MFR BLD HPLC: 6.7 %
HCT VFR BLD CALC: 38.1 %
HDLC SERPL-MCNC: 43 MG/DL
HGB BLD-MCNC: 12.6 G/DL
KETONES URINE: NEGATIVE MG/DL
LDLC SERPL CALC-MCNC: 138 MG/DL
LEUKOCYTE ESTERASE URINE: ABNORMAL
MCHC RBC-ENTMCNC: 31.2 PG
MCHC RBC-ENTMCNC: 33.1 G/DL
MCV RBC AUTO: 94.3 FL
MICROSCOPIC-UA: NORMAL
NITRITE URINE: NEGATIVE
NONHDLC SERPL-MCNC: 179 MG/DL
PH URINE: 5.5
PLATELET # BLD AUTO: 223 K/UL
POTASSIUM SERPL-SCNC: 4.8 MMOL/L
PROT SERPL-MCNC: 6.9 G/DL
PROTEIN URINE: NEGATIVE MG/DL
RBC # BLD: 4.04 M/UL
RBC # FLD: 12.8 %
RED BLOOD CELLS URINE: 1 /HPF
SODIUM SERPL-SCNC: 140 MMOL/L
SPECIFIC GRAVITY URINE: 1.02
T4 FREE SERPL-MCNC: 1.1 NG/DL
TRIGL SERPL-MCNC: 229 MG/DL
TSH SERPL-ACNC: 2.6 UIU/ML
UROBILINOGEN URINE: 0.2 MG/DL
WBC # FLD AUTO: 6.66 K/UL
WHITE BLOOD CELLS URINE: 2 /HPF

## 2025-03-20 ENCOUNTER — RX RENEWAL (OUTPATIENT)
Age: 75
End: 2025-03-20

## 2025-04-24 NOTE — ED PROVIDER NOTE - NORMAL, MLM
stated ashley all pertinent systems normal Gen - NAD, Head - NCAT, Pharynx - clear, MMM, patent, no uvular or pharyngeal edema, mild lower lip swelling noted, no drooling, heart - RRR, no m/g/r, Lungs - CTAB, no w/c/r, no tachypnea or retractions, abdomen - soft, NT, ND, Skin - No rash, Extremities - FROM, no edema, erythema, ecchymosis, Neuro - CN 2-12 intact, nl strength and sensation, nl gait.  Diagnosis - anaphylaxis.  Plan–epinephrine, Decadron, Pepcid, Benadryl.  Patient observed for rebound reaction.

## 2025-05-20 ENCOUNTER — RX RENEWAL (OUTPATIENT)
Age: 75
End: 2025-05-20

## 2025-06-17 ENCOUNTER — RX RENEWAL (OUTPATIENT)
Age: 75
End: 2025-06-17

## 2025-06-17 ENCOUNTER — NON-APPOINTMENT (OUTPATIENT)
Age: 75
End: 2025-06-17

## 2025-06-19 ENCOUNTER — APPOINTMENT (OUTPATIENT)
Dept: HEART AND VASCULAR | Facility: CLINIC | Age: 75
End: 2025-06-19
Payer: MEDICARE

## 2025-06-19 VITALS
TEMPERATURE: 98 F | HEART RATE: 92 BPM | DIASTOLIC BLOOD PRESSURE: 62 MMHG | WEIGHT: 162 LBS | SYSTOLIC BLOOD PRESSURE: 110 MMHG | HEIGHT: 60 IN | OXYGEN SATURATION: 96 % | BODY MASS INDEX: 31.8 KG/M2

## 2025-06-19 PROCEDURE — 93000 ELECTROCARDIOGRAM COMPLETE: CPT

## 2025-06-19 PROCEDURE — 99213 OFFICE O/P EST LOW 20 MIN: CPT

## 2025-06-24 ENCOUNTER — APPOINTMENT (OUTPATIENT)
Dept: HEART AND VASCULAR | Facility: CLINIC | Age: 75
End: 2025-06-24
Payer: MEDICARE

## 2025-06-24 PROCEDURE — ZZZZZ: CPT | Mod: NC

## 2025-06-24 PROCEDURE — 93351 STRESS TTE COMPLETE: CPT

## 2025-06-25 ENCOUNTER — NON-APPOINTMENT (OUTPATIENT)
Age: 75
End: 2025-06-25

## 2025-06-30 ENCOUNTER — APPOINTMENT (OUTPATIENT)
Dept: HEART AND VASCULAR | Facility: CLINIC | Age: 75
End: 2025-06-30

## 2025-07-03 ENCOUNTER — APPOINTMENT (OUTPATIENT)
Dept: HEART AND VASCULAR | Facility: CLINIC | Age: 75
End: 2025-07-03
Payer: MEDICARE

## 2025-07-03 VITALS
OXYGEN SATURATION: 96 % | DIASTOLIC BLOOD PRESSURE: 60 MMHG | WEIGHT: 157 LBS | HEIGHT: 60 IN | BODY MASS INDEX: 30.82 KG/M2 | HEART RATE: 72 BPM | SYSTOLIC BLOOD PRESSURE: 110 MMHG | TEMPERATURE: 98.6 F

## 2025-07-03 PROBLEM — R42 LIGHTHEADEDNESS: Status: ACTIVE | Noted: 2025-07-03

## 2025-07-03 PROCEDURE — 99213 OFFICE O/P EST LOW 20 MIN: CPT

## 2025-07-17 ENCOUNTER — NON-APPOINTMENT (OUTPATIENT)
Age: 75
End: 2025-07-17

## 2025-07-17 ENCOUNTER — APPOINTMENT (OUTPATIENT)
Dept: OTOLARYNGOLOGY | Facility: CLINIC | Age: 75
End: 2025-07-17
Payer: MEDICARE

## 2025-07-17 VITALS
HEIGHT: 60 IN | WEIGHT: 157 LBS | HEART RATE: 89 BPM | DIASTOLIC BLOOD PRESSURE: 75 MMHG | TEMPERATURE: 98.2 F | OXYGEN SATURATION: 97 % | BODY MASS INDEX: 30.82 KG/M2 | SYSTOLIC BLOOD PRESSURE: 122 MMHG

## 2025-07-17 PROBLEM — K21.9 LARYNGOPHARYNGEAL REFLUX (LPR): Status: ACTIVE | Noted: 2025-07-17

## 2025-07-17 PROBLEM — R05.3 CHRONIC COUGH: Status: ACTIVE | Noted: 2025-07-17

## 2025-07-17 PROBLEM — R49.8 PRESBYPHONIA: Status: ACTIVE | Noted: 2025-07-17

## 2025-07-17 PROBLEM — R49.0 DYSPHONIA: Status: ACTIVE | Noted: 2025-07-17

## 2025-07-17 PROCEDURE — 99215 OFFICE O/P EST HI 40 MIN: CPT | Mod: 25

## 2025-07-17 PROCEDURE — 31231 NASAL ENDOSCOPY DX: CPT | Mod: 59

## 2025-07-17 PROCEDURE — 69210 REMOVE IMPACTED EAR WAX UNI: CPT

## 2025-07-17 PROCEDURE — 31579 LARYNGOSCOPY TELESCOPIC: CPT

## 2025-07-17 PROCEDURE — 70371 SPEECH EVALUATION COMPLEX: CPT | Mod: 59

## 2025-07-17 RX ORDER — OMEPRAZOLE 40 MG/1
40 CAPSULE, DELAYED RELEASE ORAL
Qty: 30 | Refills: 3 | Status: ACTIVE | COMMUNITY
Start: 2025-07-17 | End: 1900-01-01

## 2025-07-17 RX ORDER — BENZONATATE 100 MG/1
100 CAPSULE ORAL
Qty: 30 | Refills: 0 | Status: ACTIVE | COMMUNITY
Start: 2025-07-17 | End: 1900-01-01

## 2025-07-17 RX ORDER — FAMOTIDINE 20 MG/1
20 TABLET, FILM COATED ORAL
Qty: 30 | Refills: 3 | Status: ACTIVE | COMMUNITY
Start: 2025-07-17 | End: 1900-01-01

## 2025-08-18 ENCOUNTER — RX RENEWAL (OUTPATIENT)
Age: 75
End: 2025-08-18

## 2025-09-18 ENCOUNTER — RX RENEWAL (OUTPATIENT)
Age: 75
End: 2025-09-18